# Patient Record
Sex: MALE | Race: BLACK OR AFRICAN AMERICAN
[De-identification: names, ages, dates, MRNs, and addresses within clinical notes are randomized per-mention and may not be internally consistent; named-entity substitution may affect disease eponyms.]

---

## 2018-08-05 ENCOUNTER — HOSPITAL ENCOUNTER (EMERGENCY)
Dept: HOSPITAL 47 - EC | Age: 48
Discharge: HOME | End: 2018-08-05
Payer: COMMERCIAL

## 2018-08-05 VITALS
DIASTOLIC BLOOD PRESSURE: 62 MMHG | RESPIRATION RATE: 16 BRPM | TEMPERATURE: 98 F | HEART RATE: 62 BPM | SYSTOLIC BLOOD PRESSURE: 121 MMHG

## 2018-08-05 DIAGNOSIS — Z87.442: ICD-10-CM

## 2018-08-05 DIAGNOSIS — R10.9: Primary | ICD-10-CM

## 2018-08-05 DIAGNOSIS — Z98.890: ICD-10-CM

## 2018-08-05 DIAGNOSIS — M54.9: ICD-10-CM

## 2018-08-05 LAB
PH UR: 6 [PH] (ref 5–8)
SP GR UR: 1.01 (ref 1–1.03)
UROBILINOGEN UR QL STRIP: <2 MG/DL (ref ?–2)

## 2018-08-05 PROCEDURE — 99284 EMERGENCY DEPT VISIT MOD MDM: CPT

## 2018-08-05 PROCEDURE — 81003 URINALYSIS AUTO W/O SCOPE: CPT

## 2018-08-05 PROCEDURE — 96372 THER/PROPH/DIAG INJ SC/IM: CPT

## 2018-08-05 PROCEDURE — 87086 URINE CULTURE/COLONY COUNT: CPT

## 2018-08-05 PROCEDURE — 74176 CT ABD & PELVIS W/O CONTRAST: CPT

## 2018-08-05 NOTE — ED
General Adult HPI





- General


Chief complaint: Back Pain/Injury


Stated complaint: POSS KIDNEY STONE


Time Seen by Provider: 08/05/18 19:13


Source: patient, RN notes reviewed, old records reviewed


Mode of arrival: ambulatory


Limitations: no limitations





- History of Present Illness


Initial comments: 





This is a 47-year-old male the ER for evaluation pain flank pain and back pain.

  Patient has no significant medical history history of kidney stones states 

this feels similar.  Left-sided flank and back pain.  Patient had no injury 

noted, no blood in his urine no fevers.  No recent IV drug use or abuse.  

Patient is complaining of left-sided flank pain left sided back pain.  No real 

modifying factors for symptoms





- Related Data


 Previous Rx's











 Medication  Instructions  Recorded


 


Naproxen [Naprosyn] 500 mg PO Q12HR PRN #30 tab 08/05/18


 


Tamsulosin [Flomax] 0.4 mg PO DAILY #7 cap 08/05/18











 Allergies











Allergy/AdvReac Type Severity Reaction Status Date / Time


 


No Known Allergies Allergy   Verified 08/05/18 19:11














Review of Systems


ROS Statement: 


Those systems with pertinent positive or pertinent negative responses have been 

documented in the HPI.





ROS Other: All systems not noted in ROS Statement are negative.





Past Medical History


Past Medical History: No Reported History


History of Any Multi-Drug Resistant Organisms: None Reported


Additional Past Surgical History / Comment(s): parathyroid surgery


Past Psychological History: No Psychological Hx Reported


Smoking Status: Never smoker


Past Alcohol Use History: Occasional


Past Drug Use History: None Reported





General Exam


Limitations: no limitations


General appearance: alert, in no apparent distress


Head exam: Present: atraumatic, normocephalic, normal inspection


Eye exam: Present: normal appearance, PERRL, EOMI.  Absent: scleral icterus, 

conjunctival injection, periorbital swelling


ENT exam: Present: normal exam, mucous membranes moist


Neck exam: Present: normal inspection.  Absent: tenderness, meningismus, 

lymphadenopathy


Respiratory exam: Present: normal lung sounds bilaterally.  Absent: respiratory 

distress, wheezes, rales, rhonchi, stridor


Cardiovascular Exam: Present: regular rate, normal rhythm, normal heart sounds.

  Absent: systolic murmur, diastolic murmur, rubs, gallop, clicks


GI/Abdominal exam: Present: soft, normal bowel sounds.  Absent: distended, 

tenderness, guarding, rebound, rigid


Extremities exam: Present: normal inspection, full ROM, normal capillary 

refill.  Absent: tenderness, pedal edema, joint swelling, calf tenderness


Back exam: Present: normal inspection


Neurological exam: Present: alert, oriented X3, CN II-XII intact


Psychiatric exam: Present: normal affect, normal mood


Skin exam: Present: warm, dry, intact, normal color.  Absent: rash





Course


 Vital Signs











  08/05/18 08/05/18





  19:09 21:20


 


Temperature 98.6 F 98.0 F


 


Pulse Rate 58 L 62


 


Respiratory 18 16





Rate  


 


Blood Pressure 125/82 121/62


 


O2 Sat by Pulse 99 98





Oximetry  














- Reevaluation(s)


Reevaluation #1: 





Chest at length patient's symptoms, negative CT.  Possible passage of kidney 

stone








Medical Decision Making





- Medical Decision Making





47 male the ER for eversion possible kidney stone, history of kidney stones.  

Patient does have CT here which is negative urine is negative.  Patient will be 

discharged home





- Lab Data


 Lab Results











  08/05/18 Range/Units





  20:00 


 


Urine Color  Light Yellow  


 


Urine Appearance  Clear  (Clear)  


 


Urine pH  6.0  (5.0-8.0)  


 


Ur Specific Gravity  1.010  (1.001-1.035)  


 


Urine Protein  Negative  (Negative)  


 


Urine Glucose (UA)  Negative  (Negative)  


 


Urine Ketones  Negative  (Negative)  


 


Urine Blood  Negative  (Negative)  


 


Urine Nitrite  Negative  (Negative)  


 


Urine Bilirubin  Negative  (Negative)  


 


Urine Urobilinogen  <2.0  (<2.0)  mg/dL


 


Ur Leukocyte Esterase  Negative  (Negative)  














- Radiology Data


Radiology results: report reviewed (CT abdomen and pelvis negative for acute 

disease), image reviewed





Disposition


Clinical Impression: 


 Left flank pain





Disposition: HOME SELF-CARE


Condition: Good


Instructions:  Acute Low Back Pain (ED)


Prescriptions: 


Naproxen [Naprosyn] 500 mg PO Q12HR PRN #30 tab


 PRN Reason: Pain


Tamsulosin [Flomax] 0.4 mg PO DAILY #7 cap


Is patient prescribed a controlled substance at d/c from ED?: No


Referrals: 


Jolanta Lake MD [Primary Care Provider] - 1-2 days

## 2018-08-05 NOTE — CT
EXAMINATION TYPE: CT abdomen pelvis wo con

 

DATE OF EXAM: 8/5/2018

 

COMPARISON: 12/22/2010

 

HISTORY: 47-year-old male Bilateral flank pain.

 

CT DLP: 445.1 mGycm. Automated exposure control for dose reduction was used.

 

TECHNIQUE: Contiguous axial scanning of the abdomen and pelvis without IV contrast. Coronal and sagit
clementina reconstructions performed. 

 

FINDINGS: 

Heart normal size without pericardial effusion. Strandy atelectasis in the lower lungs. No pleural ef
fusion.

 

Tiny hiatal hernia.

 

Noncontrast images of the liver show a couple scattered hypodense lesions too small for accurate CT c
haracterization, largest posteriorly in the right lobe measures 1.3 cm, slightly larger from 2010, mo
st suggestive of a cyst.

 

Gallbladder, adrenal glands, spleen, and pancreas show no gross anomaly.

 

No nephrolithiasis or hydronephrosis.

 

Stable borderline sized colton hepatic lymph node at 9 mm. Additional scattered prominent but nonenlar
ged mesenteric lymph nodes are unchanged.

 

No dilated small bowel, free fluid, or free air. Normal appendix. Mild to moderate stool burden. Scat
tered colonic diverticulosis, greatest in the sigmoid colon. No pericolonic inflammatory change.

 

Bladder is urine distended. Prostate gland measures 4.2 cm wide. Phlebolith in the right side of the 
pelvis. No abnormal fluid collection the pelvis or pelvic lymphadenopathy.

 

Bones: Mild degenerative changes at the hips. Stable left acetabular bone island. There is some bony 
ankylosis across the SI joints. No osseous destructive process.

 

 

 

 

IMPRESSION: 

1.  No nephrolithiasis or hydronephrosis.

2.  Colonic diverticulosis without acute diverticulitis.

3.  Tiny hiatal hernia.

4.  Ankylosis across the SI joints. Clinically correlate. Findings can be seen in the setting of infl
ammatory spondyloarthropathy. No fusion across the spine is seen at this time.

## 2019-02-28 ENCOUNTER — HOSPITAL ENCOUNTER (OUTPATIENT)
Dept: HOSPITAL 47 - LABWHC1 | Age: 49
Discharge: HOME | End: 2019-02-28
Payer: COMMERCIAL

## 2019-02-28 DIAGNOSIS — E78.5: Primary | ICD-10-CM

## 2019-02-28 LAB
ALBUMIN SERPL-MCNC: 4.8 G/DL (ref 3.8–4.9)
ALBUMIN/GLOB SERPL: 2 G/DL (ref 1.6–3.17)
ALP SERPL-CCNC: 52 U/L (ref 41–126)
ALT SERPL-CCNC: 31 U/L (ref 10–49)
ANION GAP SERPL CALC-SCNC: 8.6 MMOL/L (ref 4–12)
AST SERPL-CCNC: 28 U/L (ref 14–35)
BUN SERPL-SCNC: 17 MG/DL (ref 9–27)
CALCIUM SPEC-MCNC: 9.8 MG/DL (ref 8.7–10.3)
CHLORIDE SERPL-SCNC: 106 MMOL/L (ref 96–109)
CHOLEST SERPL-MCNC: 232 MG/DL (ref 0–200)
CO2 SERPL-SCNC: 26.4 MMOL/L (ref 21.6–31.8)
GLOBULIN SER CALC-MCNC: 2.4 G/DL (ref 1.6–3.3)
GLUCOSE SERPL-MCNC: 89 MG/DL (ref 70–110)
HDLC SERPL-MCNC: 37 MG/DL (ref 40–60)
LDLC SERPL CALC-MCNC: 154.6 MG/DL (ref 0–131)
POTASSIUM SERPL-SCNC: 4.4 MMOL/L (ref 3.5–5.5)
PROT SERPL-MCNC: 7.2 G/DL (ref 6.2–8.2)
SODIUM SERPL-SCNC: 141 MMOL/L (ref 135–145)
TRIGL SERPL-MCNC: 202 MG/DL (ref 0–149)
VLDLC SERPL CALC-MCNC: 40.4 MG/DL (ref 5–40)

## 2019-02-28 PROCEDURE — 80061 LIPID PANEL: CPT

## 2019-02-28 PROCEDURE — 80053 COMPREHEN METABOLIC PANEL: CPT

## 2019-02-28 PROCEDURE — 36415 COLL VENOUS BLD VENIPUNCTURE: CPT

## 2019-02-28 PROCEDURE — 84443 ASSAY THYROID STIM HORMONE: CPT

## 2021-01-27 ENCOUNTER — HOSPITAL ENCOUNTER (OUTPATIENT)
Dept: HOSPITAL 47 - RADUSWWP | Age: 51
Discharge: HOME | End: 2021-01-27
Attending: INTERNAL MEDICINE
Payer: COMMERCIAL

## 2021-01-27 DIAGNOSIS — K80.20: ICD-10-CM

## 2021-01-27 DIAGNOSIS — K76.89: Primary | ICD-10-CM

## 2021-01-27 PROCEDURE — 76700 US EXAM ABDOM COMPLETE: CPT

## 2021-01-27 NOTE — US
EXAMINATION TYPE: US abdomen complete

 

DATE OF EXAM: 1/27/2021

 

COMPARISON: NONE

 

CLINICAL HISTORY: Q44.6 Cystic disease of liver. Limited due to body habitus

 

EXAM MEASUREMENTS:

 

Liver Length:  12.1 cm   

Gallbladder Wall:  .2 cm   

CBD:  .5 cm

Spleen:  12 cm   

Right Kidney:  10.3 x 4.6 x 4.9  cm 

Left Kidney:  10.6 x 5.1 x 4.6 cm   

 

 

 

Pancreas:  Obscured by bowel gas

Liver:  0.9 cm cystic area left lobe  

Gallbladder:  Stones visualized

**Evidence for sonographic [place]'s sign:  No

CBD:  wnl 

Spleen:  wnl   

Right Kidney:  wnl   

Left Kidney:  wnl   

Upper IVC:  wnl  

Abd Aorta:  wnl

 

 

IMPRESSION: 

1. Hepatic cyst

2. Cholelithiasis

## 2021-01-30 ENCOUNTER — HOSPITAL ENCOUNTER (EMERGENCY)
Dept: HOSPITAL 47 - EC | Age: 51
Discharge: HOME | End: 2021-01-30
Payer: COMMERCIAL

## 2021-01-30 VITALS — RESPIRATION RATE: 20 BRPM

## 2021-01-30 VITALS — SYSTOLIC BLOOD PRESSURE: 135 MMHG | DIASTOLIC BLOOD PRESSURE: 94 MMHG | HEART RATE: 78 BPM | TEMPERATURE: 98.2 F

## 2021-01-30 DIAGNOSIS — Z79.51: ICD-10-CM

## 2021-01-30 DIAGNOSIS — K57.32: Primary | ICD-10-CM

## 2021-01-30 DIAGNOSIS — F17.200: ICD-10-CM

## 2021-01-30 LAB
ALBUMIN SERPL-MCNC: 4.8 G/DL (ref 3.5–5)
ALP SERPL-CCNC: 57 U/L (ref 38–126)
ALT SERPL-CCNC: 23 U/L (ref 4–49)
AMYLASE SERPL-CCNC: 75 U/L (ref 30–110)
ANION GAP SERPL CALC-SCNC: 12 MMOL/L
AST SERPL-CCNC: 29 U/L (ref 17–59)
BASOPHILS # BLD AUTO: 0.1 K/UL (ref 0–0.2)
BASOPHILS NFR BLD AUTO: 1 %
BUN SERPL-SCNC: 13 MG/DL (ref 9–20)
CALCIUM SPEC-MCNC: 10.3 MG/DL (ref 8.4–10.2)
CHLORIDE SERPL-SCNC: 103 MMOL/L (ref 98–107)
CO2 SERPL-SCNC: 22 MMOL/L (ref 22–30)
EOSINOPHIL # BLD AUTO: 0.3 K/UL (ref 0–0.7)
EOSINOPHIL NFR BLD AUTO: 2 %
ERYTHROCYTE [DISTWIDTH] IN BLOOD BY AUTOMATED COUNT: 4.96 M/UL (ref 4.3–5.9)
ERYTHROCYTE [DISTWIDTH] IN BLOOD: 12.7 % (ref 11.5–15.5)
GLUCOSE SERPL-MCNC: 124 MG/DL (ref 74–99)
HCT VFR BLD AUTO: 43.2 % (ref 39–53)
HGB BLD-MCNC: 15.1 GM/DL (ref 13–17.5)
LIPASE SERPL-CCNC: 63 U/L (ref 23–300)
LYMPHOCYTES # SPEC AUTO: 1.2 K/UL (ref 1–4.8)
LYMPHOCYTES NFR SPEC AUTO: 10 %
MCH RBC QN AUTO: 30.4 PG (ref 25–35)
MCHC RBC AUTO-ENTMCNC: 34.9 G/DL (ref 31–37)
MCV RBC AUTO: 87.1 FL (ref 80–100)
MONOCYTES # BLD AUTO: 0.9 K/UL (ref 0–1)
MONOCYTES NFR BLD AUTO: 7 %
NEUTROPHILS # BLD AUTO: 9.9 K/UL (ref 1.3–7.7)
NEUTROPHILS NFR BLD AUTO: 80 %
PH UR: 6 [PH] (ref 5–8)
PLATELET # BLD AUTO: 178 K/UL (ref 150–450)
POTASSIUM SERPL-SCNC: 3.9 MMOL/L (ref 3.5–5.1)
PROT SERPL-MCNC: 8.3 G/DL (ref 6.3–8.2)
SODIUM SERPL-SCNC: 137 MMOL/L (ref 137–145)
SP GR UR: 1.01 (ref 1–1.03)
UROBILINOGEN UR QL STRIP: <2 MG/DL (ref ?–2)
WBC # BLD AUTO: 12.5 K/UL (ref 3.8–10.6)

## 2021-01-30 PROCEDURE — 83690 ASSAY OF LIPASE: CPT

## 2021-01-30 PROCEDURE — 36415 COLL VENOUS BLD VENIPUNCTURE: CPT

## 2021-01-30 PROCEDURE — 81003 URINALYSIS AUTO W/O SCOPE: CPT

## 2021-01-30 PROCEDURE — 99284 EMERGENCY DEPT VISIT MOD MDM: CPT

## 2021-01-30 PROCEDURE — 82150 ASSAY OF AMYLASE: CPT

## 2021-01-30 PROCEDURE — 74177 CT ABD & PELVIS W/CONTRAST: CPT

## 2021-01-30 PROCEDURE — 80053 COMPREHEN METABOLIC PANEL: CPT

## 2021-01-30 PROCEDURE — 85025 COMPLETE CBC W/AUTO DIFF WBC: CPT

## 2021-01-30 PROCEDURE — 83605 ASSAY OF LACTIC ACID: CPT

## 2021-01-30 PROCEDURE — 96360 HYDRATION IV INFUSION INIT: CPT

## 2021-01-30 NOTE — ED
Abdominal Pain HPI





- General


Chief Complaint: Abdominal Pain


Stated Complaint: Abd Pain


Time Seen by Provider: 01/30/21 21:21


Source: patient


Mode of arrival: ambulatory


Limitations: no limitations





- History of Present Illness


Initial Comments: 


50-year-old male patient presents to the emergency department today for 

evaluation of generalized abdominal discomfort and pain.  Patient states he did 

have a bowel movement but still feels like he was quite full.  He is passing 

gas.  Denies fever or chills.  Denies nausea or vomiting.  States he has had no 

appetite today.  Denies history of abdominal surgery.  States he takes Singulair

and Claritin but no other medications. Patient denies any recent rash, cough, 

shortness of breath, chest pain, back pain, numbness, tingling, dizziness, 

weakness, hematuria, dysuria, urinary urgency, urinary frequency, headache, 

visual changes, or any other complaints.








- Related Data


                                Home Medications











 Medication  Instructions  Recorded  Confirmed


 


Loratadine-Pseudoeph  mg 1 tab PO DAILY@1900 01/30/21 01/30/21





[Claritin-D 24 Hour]   


 


Montelukast Sodium [Singulair] 10 mg PO DAILY@1900 01/30/21 01/30/21








                                  Previous Rx's











 Medication  Instructions  Recorded


 


Amoxic-Pot Clav 875-125Mg 1 tab PO Q12HR #20 tablet 01/30/21





[Augmentin 875-125]  


 


Ondansetron [Zofran ODT] 4 mg PO Q8HR PRN #10 tab 01/30/21











                                    Allergies











Allergy/AdvReac Type Severity Reaction Status Date / Time


 


No Known Allergies Allergy   Verified 01/30/21 21:59














Review of Systems


ROS Statement: 


Those systems with pertinent positive or pertinent negative responses have been 

documented in the HPI.





ROS Other: All systems not noted in ROS Statement are negative.





Past Medical History


Past Medical History: No Reported History


History of Any Multi-Drug Resistant Organisms: None Reported


Additional Past Surgical History / Comment(s): parathyroid surgery


Past Psychological History: No Psychological Hx Reported


Smoking Status: Current every day smoker


Past Alcohol Use History: Occasional


Past Drug Use History: Marijuana





General Exam


Limitations: no limitations


General appearance: alert, in no apparent distress, other (this is a well-

developed, well-nourished adult male patient in no acute distress.)


Eye exam: Present: normal appearance, PERRL, EOMI.  Absent: scleral icterus, 

conjunctival injection, periorbital swelling


ENT exam: Present: normal exam, normal oropharynx, mucous membranes moist


Respiratory exam: Present: normal lung sounds bilaterally.  Absent: respiratory 

distress, wheezes, rales, rhonchi, stridor


Cardiovascular Exam: Present: regular rate, normal rhythm, normal heart sounds. 

Absent: systolic murmur, diastolic murmur, rubs, gallop, clicks


GI/Abdominal exam: Present: soft, tenderness (generalized especially over the 

left lower and suprapubic regions), normal bowel sounds.  Absent: distended, 

guarding, rebound, rigid


Neurological exam: Present: alert, oriented X3, CN II-XII intact


Psychiatric exam: Present: normal affect, normal mood


Skin exam: Present: warm, dry, intact, normal color.  Absent: rash





Course


                                   Vital Signs











  01/30/21 01/30/21 01/30/21





  21:15 22:31 23:10


 


Temperature 98.7 F  98.2 F


 


Pulse Rate 95 74 78


 


Respiratory 20 20 20





Rate   


 


Blood Pressure 143/91 137/90 135/94


 


O2 Sat by Pulse 99 98 98





Oximetry   














Medical Decision Making





- Medical Decision Making


50-year-old male patient presents to the emergency department today for 

evaluation of generalized abdominal pain and discomfort.  Denies vomiting.  No 

fever.  Physical examination did reveal tenderness over the suprapubic and left 

lower quadrant regions.  Labs reviewed and did reveal white blood cell count at 

12.5.  Did obtain CT abdomen and pelvis did show evidence for sigmoid 

diverticulitis.  As patient is tolerating oral intake currently and there is no 

evidence for abscess we will be able to discharge home with oral antibiotics.  

He is given starter packs her pain and nausea medication.  Given a dose of 

Augmentin here.  He is instructed to follow-up with his primary care physician 

for recheck in 1-2 days.  We did discuss return parameters in detail.  He 

verbalizes understanding and agrees with this plan. Case discussed with Dr. Welsh.








- Lab Data


Result diagrams: 


                                 01/30/21 21:34





                                 01/30/21 21:34


                                   Lab Results











  01/30/21 01/30/21 01/30/21 Range/Units





  21:34 21:34 21:34 


 


WBC  12.5 H    (3.8-10.6)  k/uL


 


RBC  4.96    (4.30-5.90)  m/uL


 


Hgb  15.1    (13.0-17.5)  gm/dL


 


Hct  43.2    (39.0-53.0)  %


 


MCV  87.1    (80.0-100.0)  fL


 


MCH  30.4    (25.0-35.0)  pg


 


MCHC  34.9    (31.0-37.0)  g/dL


 


RDW  12.7    (11.5-15.5)  %


 


Plt Count  178    (150-450)  k/uL


 


MPV  10.3    


 


Neutrophils %  80    %


 


Lymphocytes %  10    %


 


Monocytes %  7    %


 


Eosinophils %  2    %


 


Basophils %  1    %


 


Neutrophils #  9.9 H    (1.3-7.7)  k/uL


 


Lymphocytes #  1.2    (1.0-4.8)  k/uL


 


Monocytes #  0.9    (0-1.0)  k/uL


 


Eosinophils #  0.3    (0-0.7)  k/uL


 


Basophils #  0.1    (0-0.2)  k/uL


 


Sodium    137  (137-145)  mmol/L


 


Potassium    3.9  (3.5-5.1)  mmol/L


 


Chloride    103  ()  mmol/L


 


Carbon Dioxide    22  (22-30)  mmol/L


 


Anion Gap    12  mmol/L


 


BUN    13  (9-20)  mg/dL


 


Creatinine    0.91  (0.66-1.25)  mg/dL


 


Est GFR (CKD-EPI)AfAm    >90  (>60 ml/min/1.73 sqM)  


 


Est GFR (CKD-EPI)NonAf    >90  (>60 ml/min/1.73 sqM)  


 


Glucose    124 H  (74-99)  mg/dL


 


Plasma Lactic Acid Alejandro     (0.7-2.0)  mmol/L


 


Calcium    10.3 H  (8.4-10.2)  mg/dL


 


Total Bilirubin    1.1  (0.2-1.3)  mg/dL


 


AST    29  (17-59)  U/L


 


ALT    23  (4-49)  U/L


 


Alkaline Phosphatase    57  ()  U/L


 


Total Protein    8.3 H  (6.3-8.2)  g/dL


 


Albumin    4.8  (3.5-5.0)  g/dL


 


Amylase    75  ()  U/L


 


Lipase    63  ()  U/L


 


Urine Color   Yellow   


 


Urine Appearance   Clear   (Clear)  


 


Urine pH   6.0   (5.0-8.0)  


 


Ur Specific Gravity   1.013   (1.001-1.035)  


 


Urine Protein   Negative   (Negative)  


 


Urine Glucose (UA)   Negative   (Negative)  


 


Urine Ketones   Negative   (Negative)  


 


Urine Blood   Negative   (Negative)  


 


Urine Nitrite   Negative   (Negative)  


 


Urine Bilirubin   Negative   (Negative)  


 


Urine Urobilinogen   <2.0   (<2.0)  mg/dL


 


Ur Leukocyte Esterase   Negative   (Negative)  














  01/30/21 Range/Units





  21:34 


 


WBC   (3.8-10.6)  k/uL


 


RBC   (4.30-5.90)  m/uL


 


Hgb   (13.0-17.5)  gm/dL


 


Hct   (39.0-53.0)  %


 


MCV   (80.0-100.0)  fL


 


MCH   (25.0-35.0)  pg


 


MCHC   (31.0-37.0)  g/dL


 


RDW   (11.5-15.5)  %


 


Plt Count   (150-450)  k/uL


 


MPV   


 


Neutrophils %   %


 


Lymphocytes %   %


 


Monocytes %   %


 


Eosinophils %   %


 


Basophils %   %


 


Neutrophils #   (1.3-7.7)  k/uL


 


Lymphocytes #   (1.0-4.8)  k/uL


 


Monocytes #   (0-1.0)  k/uL


 


Eosinophils #   (0-0.7)  k/uL


 


Basophils #   (0-0.2)  k/uL


 


Sodium   (137-145)  mmol/L


 


Potassium   (3.5-5.1)  mmol/L


 


Chloride   ()  mmol/L


 


Carbon Dioxide   (22-30)  mmol/L


 


Anion Gap   mmol/L


 


BUN   (9-20)  mg/dL


 


Creatinine   (0.66-1.25)  mg/dL


 


Est GFR (CKD-EPI)AfAm   (>60 ml/min/1.73 sqM)  


 


Est GFR (CKD-EPI)NonAf   (>60 ml/min/1.73 sqM)  


 


Glucose   (74-99)  mg/dL


 


Plasma Lactic Acid Alejandro  1.3  (0.7-2.0)  mmol/L


 


Calcium   (8.4-10.2)  mg/dL


 


Total Bilirubin   (0.2-1.3)  mg/dL


 


AST   (17-59)  U/L


 


ALT   (4-49)  U/L


 


Alkaline Phosphatase   ()  U/L


 


Total Protein   (6.3-8.2)  g/dL


 


Albumin   (3.5-5.0)  g/dL


 


Amylase   ()  U/L


 


Lipase   ()  U/L


 


Urine Color   


 


Urine Appearance   (Clear)  


 


Urine pH   (5.0-8.0)  


 


Ur Specific Gravity   (1.001-1.035)  


 


Urine Protein   (Negative)  


 


Urine Glucose (UA)   (Negative)  


 


Urine Ketones   (Negative)  


 


Urine Blood   (Negative)  


 


Urine Nitrite   (Negative)  


 


Urine Bilirubin   (Negative)  


 


Urine Urobilinogen   (<2.0)  mg/dL


 


Ur Leukocyte Esterase   (Negative)  














- Radiology Data


Radiology results: report reviewed, image reviewed


CT abdomen and pelvis with contrast was obtained.  Report reviewed in its 

entirety.  Impression by Dr. Castro shows sigmoid diverticulitis.  No 

drainable fluid collection.





Disposition


Clinical Impression: 


 Sigmoid diverticulitis





Disposition: HOME SELF-CARE


Condition: Good


Instructions (If sedation given, give patient instructions):  Diverticulitis 

(ED), Diverticulitis Diet (ED)


Additional Instructions: 


Take medications as directed. Stick to bland diet for the next few days. Follow 

up with your primary care physician for recheck in 1-2 days. Return to the 

emergency department for any new, worsening, or concerning symptoms. 


Prescriptions: 


Amoxic-Pot Clav 875-125Mg [Augmentin 875-125] 1 tab PO Q12HR #20 tablet


Ondansetron [Zofran ODT] 4 mg PO Q8HR PRN #10 tab


 PRN Reason: Nausea


Is patient prescribed a controlled substance at d/c from ED?: No


Referrals: 


Rachel Camejo MD [Primary Care Provider] - 1-2 days


Time of Disposition: 22:55

## 2021-01-30 NOTE — CT
EXAMINATION TYPE: CT abdomen pelvis w con

 

DATE OF EXAM: 1/30/2021

 

COMPARISON: 8/5/2018

 

HISTORY: Generalized abdominal pain.

 

CT DLP: 863.3 mGycm

Automated exposure control for dose reduction was used.

 

CONTRAST: 

Performed with IV Contrast, patient injected with 100 mL of Isovue 300.

 

 

Lung bases are clear of consolidation. There is no pleural effusion. Heart size is normal.

 

There are scattered cysts in the liver that measure up to 1.5 cm. Bile ducts are not dilated. Spleen 
pancreas stomach gallbladder appear intact.

 

There is no adrenal mass. Kidneys show satisfactory contrast opacification. There is no hydronephrosi
s. Ureters are not dilated. There is no retroperitoneal adenopathy. Appendix is posterior and appears
 normal. Bladder distends smoothly. There is no inguinal hernia. There is no free fluid in the pelvis
.

 

There is fat stranding and wall thickening of the mid sigmoid colon. There is 6 cm segment of involve
ment. There are multiple sigmoid diverticula.

There is no evidence of bowel obstruction. There is no free air. There is no ascites.

Lumbar vertebra have normal alignment. Posterior elements are intact. There is no lumbar compression 
fracture. Bony pelvis is intact. The hip joints are intact.

 

IMPRESSION:

There is sigmoid diverticulitis. No drainable fluid collection.

## 2021-04-08 ENCOUNTER — HOSPITAL ENCOUNTER (OUTPATIENT)
Dept: HOSPITAL 47 - SLEEP | Age: 51
Discharge: HOME | End: 2021-04-08
Attending: INTERNAL MEDICINE
Payer: COMMERCIAL

## 2021-04-08 DIAGNOSIS — T78.40XA: ICD-10-CM

## 2021-04-08 DIAGNOSIS — Z90.09: ICD-10-CM

## 2021-04-08 DIAGNOSIS — G47.33: Primary | ICD-10-CM

## 2021-04-08 DIAGNOSIS — Z99.89: ICD-10-CM

## 2021-04-08 DIAGNOSIS — G47.31: ICD-10-CM

## 2021-04-08 PROCEDURE — 99211 OFF/OP EST MAY X REQ PHY/QHP: CPT

## 2021-04-08 NOTE — CONS
CONSULTATION



DATE OF SERVICE:

04/08/2021



This 50-year-old gentleman who has been re-evaluated in [facility] for obstructive

sleep apnea-hypopnea syndrome.



HISTORY OF PRESENT ILLNESS/SLEEP-WAKE EVALUATION:

The patient has been diagnosed with severe central and obstructive sleep apnea-hypopnea

syndrome in our institution in 2011.  At that time, apnea-hypopnea index was 40.9,

which includes 51 obstructive apneas, 73 central apneas, 44 mixed apneas, 23 hypopneas.

The patient was treated with BiPAP with a pressure of 12/6 and for this 10 years,

patient continued to use his BiPAP equipment. About 3 months ago, he received new BiPAP

and came to re-evaluation to sleep center.



He continued to use BiPAP equipment every night.  Usually no significant snoring with

BiPAP.  His sleep schedule from 11 p.m. to 4:30 a.m. on weekdays and from midnight

until 9 a.m. on weekends.  No problem with falling asleep, although has TV set in

bedroom.  He sleeps on the side position.  He wakes up from sleep up to 2 times with

one episode of nocturia.



No history of hypnagogic hallucinations, sleep paralysis or cataplexy.  He does not

take any naps.  [place] Sleepiness Scale today is 7.



PAST MEDICAL HISTORY:

Positive for allergies.



PAST SURGICAL HISTORY:

Parathyroidectomy.



MEDICATIONS:

Claritin-D and Singulair.



SOCIAL HISTORY:

Negative for smoking or using alcohol.



FAMILY HISTORY:

Hyperlipidemia and thyroid problems.



REVIEW OF SYSTEMS:

Occasional awakenings from sleep, snoring, and problem with breathing without BiPAP.



PHYSICAL EXAMINATION:

GENERAL:  gentleman without distress.

VITAL SIGNS: /84, HR 57, RR 15, height 5 feet 8-1/2 inches, weight 184.0, BMI

27.8, temperature 98.0, oxygen saturation at room air 98%.

HEENT: PERRLA, EOMI. Low position of soft palate.

NECK: 16 inches in circumference.

LUNGS:  Clear to percussion and to auscultation.  Good air exchange.  No wheezing or

rhonchi.

HEART:  S1, S2 regular.  No murmurs, gallops, or rubs.

ABDOMEN:  Soft and nontender.  Bowel sounds are present.  No organomegaly appreciated.

EXTREMITIES: No clubbing or cyanosis.

CNS:  Awake, alert, and oriented X3.  Cranial nerves 2 to 7 intact.  There is no

fasciculation or atrophy. noted.  No focal deficits observed.



I checked the patient BiPAP unit.  BiPAP pressure is 12/16 cm of water. Usage is 28 out

of 30 nights and 23 out of 30 nights more than 4 hours, average usage 5.3 hours per

night. Leak is 10 L/minute.  Apnea-hypopnea index 6.8, which includes central apnea-

hypopnea index 3.6.



IMPRESSION:

1. 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  The patient

    demonstrated good compliance with treatment, benefitting from treatment.  Apnea-

    hypopnea index still slightly increased by results of reading from his BiPAP unit.

2. Allergy.

3. Status post parathyroidectomy.

4. History of snoring.



PLAN:

1. I adjusted his BiPAP to automatic regimen with maximal inspiratory pressure 15 and

    minimal expiratory pressure of 5 with pressure support 4.

2. Patient should continue to use BiPAP equipment every night for the whole night.

3. Watching weight.

4. Sleep hygiene with regular time in bed for 7-1/2 or 8 hours.

5. No driving if feeling sleepiness.

6. Follow-up visit in 6 months.  The patient should have regular monitoring in Sleep

    Center to be sure that the equipment is working properly and he gets all necessary

    supplies.

Thank you very much for allowing me to participate in management of your patient.



Sincerely,



MD Josh, PhD, FAASM

Diplomat of [employer]

American Board of [employer]

Medical Director of [facility] [place]



MMODL / JAKOBN: [id number] / Job#: [id number]

## 2021-05-15 ENCOUNTER — HOSPITAL ENCOUNTER (EMERGENCY)
Dept: HOSPITAL 47 - EC | Age: 51
Discharge: HOME | End: 2021-05-15
Payer: COMMERCIAL

## 2021-05-15 VITALS
DIASTOLIC BLOOD PRESSURE: 96 MMHG | RESPIRATION RATE: 16 BRPM | TEMPERATURE: 97.8 F | HEART RATE: 56 BPM | SYSTOLIC BLOOD PRESSURE: 151 MMHG

## 2021-05-15 DIAGNOSIS — Z20.822: Primary | ICD-10-CM

## 2021-05-15 DIAGNOSIS — F12.90: ICD-10-CM

## 2021-05-15 DIAGNOSIS — Z87.891: ICD-10-CM

## 2021-05-15 PROCEDURE — 87635 SARS-COV-2 COVID-19 AMP PRB: CPT

## 2021-05-15 PROCEDURE — 99283 EMERGENCY DEPT VISIT LOW MDM: CPT

## 2021-05-15 NOTE — ED
Recheck HPI





- General


Chief Complaint: Recheck/Abnormal Lab/Rx


Stated Complaint: Covid testing


Time Seen by Provider: 05/15/21 22:15


Source: patient


Mode of arrival: ambulatory


Limitations: no limitations





- History of Present Illness


Initial Comments: 


50-year-old male presenting for covert exposure  Patient denies any recent 

fever, chills, cough, shortness of breath, chest pain, back pain, abdominal 

pain, nausea or vomiting, numbness or tingling, dysuria or hematuria, 

constipation or diarrhea, headaches or visual changes, or any other complaints. 

Fully vaccinated.








- Related Data


                                Home Medications











 Medication  Instructions  Recorded  Confirmed


 


Loratadine-Pseudoeph  mg 1 tab PO DAILY@1900 01/30/21 01/30/21





[Claritin-D 24 Hour]   


 


Montelukast Sodium [Singulair] 10 mg PO DAILY@1900 01/30/21 01/30/21








                                  Previous Rx's











 Medication  Instructions  Recorded


 


Amoxic-Pot Clav 875-125Mg 1 tab PO Q12HR #20 tablet 01/30/21





[Augmentin 875-125]  


 


Ondansetron [Zofran ODT] 4 mg PO Q8HR PRN #10 tab 01/30/21











                                    Allergies











Allergy/AdvReac Type Severity Reaction Status Date / Time


 


No Known Allergies Allergy   Verified 05/15/21 22:12














Review of Systems


ROS Statement: 


Those systems with pertinent positive or pertinent negative responses have been 

documented in the HPI.





ROS Other: All systems not noted in ROS Statement are negative.





Past Medical History


Past Medical History: No Reported History


History of Any Multi-Drug Resistant Organisms: None Reported


Additional Past Surgical History / Comment(s): parathyroid surgery


Past Psychological History: No Psychological Hx Reported


Smoking Status: Former smoker


Past Alcohol Use History: Occasional


Past Drug Use History: Marijuana





General Exam





- General Exam Comments


Initial Comments: 


General:  The patient is awake and alert, in no distress


Eye: pupils are equal, round and reactive to light, extra-ocular movements are 

intact.  No nystagmus.  There is normal conjunctiva bilaterally.  No signs of 

icterus.  


Neurological:  A&O x 3. CN II-XII intact grossly, There are no obvious motor or 

sensory deficits. Coordination appears grossly intact. Speech is normal.


Skin:  Skin is warm and dry and no rashes or lesions are noted. 


Psychiatric:  Cooperative, appropriate mood & affect, normal judgment.  





Limitations: no limitations





Course


                                   Vital Signs











  05/15/21





  22:12


 


Temperature 97.8 F


 


Pulse Rate 56 L


 


Respiratory 16





Rate 


 


Blood Pressure 151/96


 


O2 Sat by Pulse 98





Oximetry 














- Reevaluation(s)


Reevaluation #1: 


called with covid results. pt answered and is aware.


05/15/21 23:16








Medical Decision Making





- Medical Decision Making


swabbed. wants to leave and be called with results. pt dixie be notified. 








- Lab Data


                                   Lab Results











  05/15/21 Range/Units





  22:29 


 


Coronavirus (PCR)  Not Detected  (Not Detectd)  














Disposition


Clinical Impression: 


 Exposure to COVID-19 virus





Disposition: HOME SELF-CARE


Condition: Good


Instructions (If sedation given, give patient instructions):  Coronavirus 

Disease 2019 (COVID-19)


Additional Instructions: 


 Please return to emergency room if the symptoms increase or worsen or for any 

other concerns.


Is patient prescribed a controlled substance at d/c from ED?: No


Referrals: 


Rachel Camejo MD [Primary Care Provider] - 1-2 days


Time of Disposition: 22:32

## 2022-01-20 ENCOUNTER — HOSPITAL ENCOUNTER (EMERGENCY)
Dept: HOSPITAL 47 - EC | Age: 52
Discharge: HOME | End: 2022-01-20
Payer: COMMERCIAL

## 2022-01-20 VITALS — SYSTOLIC BLOOD PRESSURE: 122 MMHG | DIASTOLIC BLOOD PRESSURE: 87 MMHG

## 2022-01-20 VITALS — RESPIRATION RATE: 18 BRPM | TEMPERATURE: 98.1 F

## 2022-01-20 VITALS — HEART RATE: 60 BPM

## 2022-01-20 DIAGNOSIS — F12.90: ICD-10-CM

## 2022-01-20 DIAGNOSIS — F17.200: ICD-10-CM

## 2022-01-20 DIAGNOSIS — K29.70: Primary | ICD-10-CM

## 2022-01-20 LAB
ALBUMIN SERPL-MCNC: 4.7 G/DL (ref 3.5–5)
ALP SERPL-CCNC: 60 U/L (ref 38–126)
ALT SERPL-CCNC: 27 U/L (ref 4–49)
ANION GAP SERPL CALC-SCNC: 10 MMOL/L
APTT BLD: 28.2 SEC (ref 22–30)
AST SERPL-CCNC: 30 U/L (ref 17–59)
BASOPHILS # BLD AUTO: 0 K/UL (ref 0–0.2)
BASOPHILS NFR BLD AUTO: 1 %
BUN SERPL-SCNC: 14 MG/DL (ref 9–20)
CALCIUM SPEC-MCNC: 10.3 MG/DL (ref 8.4–10.2)
CHLORIDE SERPL-SCNC: 104 MMOL/L (ref 98–107)
CO2 SERPL-SCNC: 26 MMOL/L (ref 22–30)
EOSINOPHIL # BLD AUTO: 0.1 K/UL (ref 0–0.7)
EOSINOPHIL NFR BLD AUTO: 2 %
ERYTHROCYTE [DISTWIDTH] IN BLOOD BY AUTOMATED COUNT: 4.97 M/UL (ref 4.3–5.9)
ERYTHROCYTE [DISTWIDTH] IN BLOOD: 12.7 % (ref 11.5–15.5)
GLUCOSE SERPL-MCNC: 91 MG/DL (ref 74–99)
HCT VFR BLD AUTO: 45.1 % (ref 39–53)
HGB BLD-MCNC: 15 GM/DL (ref 13–17.5)
INR PPP: 1 (ref ?–1.2)
LIPASE SERPL-CCNC: 113 U/L (ref 23–300)
LYMPHOCYTES # SPEC AUTO: 1.1 K/UL (ref 1–4.8)
LYMPHOCYTES NFR SPEC AUTO: 22 %
MAGNESIUM SPEC-SCNC: 2 MG/DL (ref 1.6–2.3)
MCH RBC QN AUTO: 30.2 PG (ref 25–35)
MCHC RBC AUTO-ENTMCNC: 33.3 G/DL (ref 31–37)
MCV RBC AUTO: 90.9 FL (ref 80–100)
MONOCYTES # BLD AUTO: 0.4 K/UL (ref 0–1)
MONOCYTES NFR BLD AUTO: 7 %
NEUTROPHILS # BLD AUTO: 3.4 K/UL (ref 1.3–7.7)
NEUTROPHILS NFR BLD AUTO: 66 %
PLATELET # BLD AUTO: 186 K/UL (ref 150–450)
POTASSIUM SERPL-SCNC: 4.3 MMOL/L (ref 3.5–5.1)
PROT SERPL-MCNC: 8.1 G/DL (ref 6.3–8.2)
PT BLD: 10.4 SEC (ref 9–12)
SODIUM SERPL-SCNC: 140 MMOL/L (ref 137–145)
WBC # BLD AUTO: 5.1 K/UL (ref 3.8–10.6)

## 2022-01-20 PROCEDURE — 93005 ELECTROCARDIOGRAM TRACING: CPT

## 2022-01-20 PROCEDURE — 85610 PROTHROMBIN TIME: CPT

## 2022-01-20 PROCEDURE — 80053 COMPREHEN METABOLIC PANEL: CPT

## 2022-01-20 PROCEDURE — 84484 ASSAY OF TROPONIN QUANT: CPT

## 2022-01-20 PROCEDURE — 99284 EMERGENCY DEPT VISIT MOD MDM: CPT

## 2022-01-20 PROCEDURE — 83735 ASSAY OF MAGNESIUM: CPT

## 2022-01-20 PROCEDURE — 36415 COLL VENOUS BLD VENIPUNCTURE: CPT

## 2022-01-20 PROCEDURE — 85025 COMPLETE CBC W/AUTO DIFF WBC: CPT

## 2022-01-20 PROCEDURE — 85730 THROMBOPLASTIN TIME PARTIAL: CPT

## 2022-01-20 PROCEDURE — 71046 X-RAY EXAM CHEST 2 VIEWS: CPT

## 2022-01-20 PROCEDURE — 83690 ASSAY OF LIPASE: CPT

## 2022-01-20 NOTE — XR
EXAMINATION TYPE: XR chest 2V

 

DATE OF EXAM: 1/20/2022

 

COMPARISON: None

 

INDICATION: Pain

 

TECHNIQUE:  Frontal and lateral views of the chest are obtained.

 

FINDINGS:  

The heart size is normal.  

The pulmonary vasculature is normal.

The lungs are clear.

 

IMPRESSION:  

1. No acute pulmonary process.

## 2022-01-20 NOTE — ED
Chest Pain HPI





- General


Chief Complaint: Chest Pain


Stated Complaint: Chest pain


Time Seen by Provider: 01/20/22 08:53


Source: patient, RN notes reviewed


Mode of arrival: ambulatory


Limitations: no limitations





- History of Present Illness


Initial Comments: 


51-year-old male presents emergency Department with chief complaint of 

epigastric discomfort.  Patient states started last night worsened today with 

some exertion, but also worsened at rest at work.  Patient states that it hurts 

to press ripples ribs in his epigastric region he did take some Tums with seemed

to help with didn't really alleviate symptoms.  Patient has no prior cardiac 

disease denies hypertension hyperlipidemia diabetes no family heart disease.  

Patient states does hurt with movement, is nonradiating at this time.  Denies 

any shortness of breath no diaphoretic episodes.  No vomiting.








- Related Data


                                Home Medications











 Medication  Instructions  Recorded  Confirmed


 


Loratadine-Pseudoeph  mg 1 tab PO DAILY 01/30/21 01/20/22





[Claritin-D 24 Hour]   


 


Montelukast Sodium [Singulair] 10 mg PO DAILY 01/30/21 01/20/22








                                  Previous Rx's











 Medication  Instructions  Recorded


 


Omeprazole [PriLOSEC] 40 mg PO DAILY #14 cap 01/20/22











                                    Allergies











Allergy/AdvReac Type Severity Reaction Status Date / Time


 


No Known Allergies Allergy   Verified 01/20/22 08:50














Review of Systems


ROS Statement: 


Those systems with pertinent positive or pertinent negative responses have been 

documented in the HPI.





ROS Other: All systems not noted in ROS Statement are negative.





Past Medical History


Past Medical History: No Reported History


History of Any Multi-Drug Resistant Organisms: None Reported


Additional Past Surgical History / Comment(s): parathyroid surgery


Past Psychological History: No Psychological Hx Reported


Smoking Status: Current every day smoker


Past Alcohol Use History: Occasional


Past Drug Use History: Marijuana





General Exam


Limitations: no limitations


General appearance: alert, in no apparent distress


Head exam: Present: atraumatic, normocephalic, normal inspection


Eye exam: Present: normal appearance, PERRL, EOMI.  Absent: scleral icterus, 

conjunctival injection, periorbital swelling


ENT exam: Present: normal exam, normal oropharynx, mucous membranes moist


Neck exam: Present: normal inspection, full ROM.  Absent: tenderness, 

meningismus, lymphadenopathy


Respiratory exam: Present: normal lung sounds bilaterally.  Absent: respiratory 

distress, wheezes, rales, rhonchi, stridor


Cardiovascular Exam: Present: regular rate, normal rhythm, normal heart sounds. 

 Absent: systolic murmur, diastolic murmur, rubs, gallop, clicks


GI/Abdominal exam: Present: soft, tenderness (Epigastric), normal bowel sounds. 

 Absent: distended, guarding, rebound, rigid





Course


                                   Vital Signs











  01/20/22 01/20/22 01/20/22





  08:50 09:47 09:50


 


Temperature 98.1 F  


 


Pulse Rate 61 61 


 


Pulse Rate [   60





Sitting Cardiac   





Monitor]   


 


Respiratory 18 18 





Rate   


 


Blood Pressure 147/85 142/93 


 


O2 Sat by Pulse 100 100 





Oximetry   














Chest Pain MDM





- MDM


51-year-old male presented for epigastric pain.  Patient has very localized 

tenderness this region.  Patient for concluding labs EKG and chest x-ray with no

acute findings.  Patient's troponin is negative.  EKG does not reveal any acute 

changes.  Patient did have relief of symptoms with GI cocktail.  He did have 

some relief of symptoms with Tums at home.  Patient will be discharged with 

omeprazole patient was given strict return parameters.








Disposition


Clinical Impression: 


 Gastritis





Disposition: HOME SELF-CARE


Condition: Stable


Instructions (If sedation given, give patient instructions):  Gastritis (ED)


Additional Instructions: 


Please return to the Emergency Department if symptoms worsen or any other 

concerns.


Prescriptions: 


Omeprazole [PriLOSEC] 40 mg PO DAILY #14 cap


Is patient prescribed a controlled substance at d/c from ED?: No


Referrals: 


Rachel Camejo MD [Primary Care Provider] - 1-2 days


Time of Disposition: 10:39

## 2023-10-03 ENCOUNTER — HOSPITAL ENCOUNTER (EMERGENCY)
Dept: HOSPITAL 47 - EC | Age: 53
LOS: 1 days | Discharge: HOME | End: 2023-10-04
Payer: COMMERCIAL

## 2023-10-03 VITALS — TEMPERATURE: 98.4 F

## 2023-10-03 DIAGNOSIS — K57.90: ICD-10-CM

## 2023-10-03 DIAGNOSIS — K62.5: Primary | ICD-10-CM

## 2023-10-03 DIAGNOSIS — Z87.891: ICD-10-CM

## 2023-10-03 DIAGNOSIS — F12.90: ICD-10-CM

## 2023-10-03 PROCEDURE — 36415 COLL VENOUS BLD VENIPUNCTURE: CPT

## 2023-10-03 PROCEDURE — 99285 EMERGENCY DEPT VISIT HI MDM: CPT

## 2023-10-03 PROCEDURE — 96360 HYDRATION IV INFUSION INIT: CPT

## 2023-10-03 PROCEDURE — 80053 COMPREHEN METABOLIC PANEL: CPT

## 2023-10-03 PROCEDURE — 74177 CT ABD & PELVIS W/CONTRAST: CPT

## 2023-10-03 PROCEDURE — 85730 THROMBOPLASTIN TIME PARTIAL: CPT

## 2023-10-03 PROCEDURE — 85025 COMPLETE CBC W/AUTO DIFF WBC: CPT

## 2023-10-04 VITALS — HEART RATE: 78 BPM | RESPIRATION RATE: 18 BRPM | DIASTOLIC BLOOD PRESSURE: 94 MMHG | SYSTOLIC BLOOD PRESSURE: 150 MMHG

## 2023-10-04 LAB
ALBUMIN SERPL-MCNC: 4.4 G/DL (ref 3.5–5)
ALP SERPL-CCNC: 54 U/L (ref 38–126)
ALT SERPL-CCNC: 25 U/L (ref 4–49)
ANION GAP SERPL CALC-SCNC: 13 MMOL/L
AST SERPL-CCNC: 27 U/L (ref 17–59)
BASOPHILS # BLD AUTO: 0.1 K/UL (ref 0–0.2)
BASOPHILS NFR BLD AUTO: 1 %
BUN SERPL-SCNC: 19 MG/DL (ref 9–20)
CALCIUM SPEC-MCNC: 9.4 MG/DL (ref 8.4–10.2)
CHLORIDE SERPL-SCNC: 106 MMOL/L (ref 98–107)
CO2 SERPL-SCNC: 22 MMOL/L (ref 22–30)
EOSINOPHIL # BLD AUTO: 0.3 K/UL (ref 0–0.7)
EOSINOPHIL NFR BLD AUTO: 3 %
ERYTHROCYTE [DISTWIDTH] IN BLOOD BY AUTOMATED COUNT: 4.66 M/UL (ref 4.3–5.9)
ERYTHROCYTE [DISTWIDTH] IN BLOOD: 12.3 % (ref 11.5–15.5)
GLUCOSE SERPL-MCNC: 137 MG/DL (ref 74–99)
HCT VFR BLD AUTO: 42.3 % (ref 39–53)
HGB BLD-MCNC: 14.2 GM/DL (ref 13–17.5)
LYMPHOCYTES # SPEC AUTO: 2.4 K/UL (ref 1–4.8)
LYMPHOCYTES NFR SPEC AUTO: 33 %
MCH RBC QN AUTO: 30.5 PG (ref 25–35)
MCHC RBC AUTO-ENTMCNC: 33.5 G/DL (ref 31–37)
MCV RBC AUTO: 90.8 FL (ref 80–100)
MONOCYTES # BLD AUTO: 0.5 K/UL (ref 0–1)
MONOCYTES NFR BLD AUTO: 7 %
NEUTROPHILS # BLD AUTO: 4 K/UL (ref 1.3–7.7)
NEUTROPHILS NFR BLD AUTO: 55 %
PLATELET # BLD AUTO: 221 K/UL (ref 150–450)
POTASSIUM SERPL-SCNC: 4.2 MMOL/L (ref 3.5–5.1)
PROT SERPL-MCNC: 7.4 G/DL (ref 6.3–8.2)
SODIUM SERPL-SCNC: 141 MMOL/L (ref 137–145)
WBC # BLD AUTO: 7.3 K/UL (ref 3.8–10.6)

## 2023-10-04 NOTE — ED
GI Bleed HPI





- General


Chief complaint: GI Bleed


Stated complaint: Rectal bleeding


Time Seen by Provider: 10/03/23 23:29


Source: patient


Mode of arrival: ambulatory


Limitations: no limitations





- History of Present Illness


Initial comments: 


52-year-old male presenting with chief complaint of rectal bleeding.  States 

that the bleeding started this evening and is only present when he goes to have 

a bowel movement.  Blood is dark red.  Patient has history of diverticulitis.  

He denies any rectal or abdominal pain.  No nausea, vomiting, dizziness, chest 

pain, difficulty breathing.  Patient does not take blood thinners.  No 

constipation or diarrhea.  No fevers or chills.








- Related Data


                                Home Medications











 Medication  Instructions  Recorded  Confirmed


 


Loratadine-Pseudoeph  mg 1 tab PO DAILY 01/30/21 01/20/22





[Claritin-D 24 Hour]   


 


Montelukast Sodium [Singulair] 10 mg PO DAILY 01/30/21 01/20/22








                                  Previous Rx's











 Medication  Instructions  Recorded


 


Omeprazole [PriLOSEC] 40 mg PO DAILY #14 cap 01/20/22











                                    Allergies











Allergy/AdvReac Type Severity Reaction Status Date / Time


 


No Known Allergies Allergy   Verified 10/03/23 23:07














Review of Systems


ROS Statement: 


Those systems with pertinent positive or pertinent negative responses have been 

documented in the HPI.





ROS Other: All systems not noted in ROS Statement are negative.





Past Medical History


Past Medical History: Hyperlipidemia


History of Any Multi-Drug Resistant Organisms: None Reported


Additional Past Surgical History / Comment(s): parathyroid surgery


Past Psychological History: No Psychological Hx Reported


Smoking Status: Former smoker


Past Alcohol Use History: Occasional


Past Drug Use History: Marijuana





General Exam


Limitations: no limitations


General appearance: alert, in no apparent distress


Head exam: Present: atraumatic, normocephalic, normal inspection


Eye exam: Present: normal appearance, EOMI


Neck exam: Present: normal inspection, full ROM


Respiratory exam: Present: normal lung sounds bilaterally.  Absent: respiratory 

distress, wheezes, rales, rhonchi, stridor


Cardiovascular Exam: Present: regular rate, normal rhythm, normal heart sounds. 

Absent: systolic murmur, diastolic murmur, rubs, gallop, clicks


GI/Abdominal exam: Present: soft, tenderness (Mild left lower quadrant 

tenderness).  Absent: distended, guarding, rebound, rigid


Rectal exam: Present: normal inspection, heme (-) stool


Neurological exam: Present: alert, oriented X3


Psychiatric exam: Present: normal affect, normal mood


Skin exam: Present: warm, dry, intact, normal color.  Absent: rash





Course


                                   Vital Signs











  10/03/23 10/04/23





  23:05 02:53


 


Temperature 98.4 F 98.4 F


 


Pulse Rate 123 H 78


 


Respiratory 20 18





Rate  


 


Blood Pressure 167/104 150/94


 


O2 Sat by Pulse 96 99





Oximetry  














Medical Decision Making





- Medical Decision Making


Was pt. sent in by a medical professional or institution (, PA, NP, urgent 

care, hospital, or nursing home...) When possible be specific


@  -No


Did you speak to anyone other than the patient for history (EMS, parent, family,

police, friend...)? What history was obtained from this source 


@  -No


Did you review nursing and triage notes (agree or disagree)?  Why? 


@  -I reviewed and agree with nursing and triage notes


Were old charts reviewed (outside hosp., previous admission, EMS record, old 

EKG, old radiological studies, urgent care reports/EKG's, nursing home records)?

Report findings 


@  -No old charts were reviewed


Differential Diagnosis (chest pain, altered mental status, abdominal pain women,

abdominal pain men, vaginal bleeding, weakness, fever, dyspnea, syncope, 

headache, dizziness, GI bleed, back pain, seizure, CVA, palpatations, mental 

health, musculoskeletal)? 


@  -MDM Differential GI Bleed:


Esophageal varices, aortoenteric fistula, [place], gastritis, peptic ulcer

disease, diverticulosis, inflammatory bowel disease, hemorrhoids, fissure, 

colitis, malignancy, Meckel’s diverticulum… this is not meant to be an all-

inclusive list.


EKG interpreted by me (3pts min.).


@  -As above


X-rays interpreted by me (1pt min.).


@  -None done


CT interpreted by me (1pt min.).


@  -No acute findings in the abdomen or pelvis


U/S interpreted by me (1pt. min.).


@  -None done


What testing was considered but not performed or refused? (CT, X-rays, U/S, 

labs)? Why?


@  -None


What meds were considered but not given or refused? Why?


@  -None


Did you discuss the management of the patient with other professionals 

(professionals i.e. Dr., PA, NP, lab, RT, psych nurse, , , 

teacher, , )? Give summary


@  -No


Was smoking cessation discussed for >3mins.?


@  -No


Was critical care preformed (if so, how long)?


@  -No


Were there social determinants of health that impacted care today? How? 

(Homelessness, low income, unemployed, alcoholism, drug addiction, 

transportation, low edu. Level, literacy, decrease access to med. care, residential, 

rehab)?


@  -No


Was there de-escalation of care discussed even if they declined (Discuss DNR or 

withdrawal of care, Hospice)? DNR status


@  -No


What co-morbidities impacted this encounter? (DM, HTN, Smoking, COPD, CAD, 

Cancer, CVA, ARF, Chemo, Hep., AIDS, mental health diagnosis, sleep apnea, 

morbid obesity)?


@  -None


Was patient admitted / discharged? Hospital course, mention meds given and 

route, prescriptions, significant lab abnormalities, going to OR and other 

pertinent info.


@  -52-year-old male presenting with chief complaint of rectal bleeding that 

started the evening.  Physical examination is conducted.  Minor left lower 

quadrant tenderness noted.  No randall blood seen on rectal exam.  No leukocytosis

or anemia.  CT is negative for acute process.  Diverticulosis is noted.  

Bleeding is likely due to diverticulosis or internal hemorrhoids.  No blood 

thinners.  Patient is hemodynamically stable.  Patient is educated on today's 

findings and unexpected management.  Educated on alarms symptoms. Follow-up with

PCP.  Report back to ER with any new or worsening symptoms.  Discussed return 

parameters and answered all questions.  Patient conveyed verbal understanding 

and agreed to the plan.  I discussed this case in detail with my attending Dr. Burk


Undiagnosed new problem with uncertain prognosis?


@  -No


Drug Therapy requiring intensive monitoring for toxicity (Heparin, Nitro, 

Insulin, Cardizem)?


@  -No


Were any procedures done?


@  -No


Diagnosis/symptom?


@  -rectal bleeding


Acute, or Chronic, or Acute on Chronic?


@  -Acute


Uncomplicated (without systemic symptoms) or Complicated (systemic symptoms)?


@  -Uncomplicated


Side effects of treatment?


@  -No


Exacerbation, Progression, or Severe Exacerbation?


@  -No


Poses a threat to life or bodily function? How? (Chest pain, USA, MI, pneumonia,

PE, COPD, DKA, ARF, appy, cholecystitis, CVA, Diverticulitis, Homicidal, 

Suicidal, threat to staff... and all critical care pts)


@  -Low likelihood








- Lab Data


Result diagrams: 


                                 10/03/23 23:59





                                 10/03/23 23:59


                                   Lab Results











  10/03/23 10/03/23 10/03/23 Range/Units





  23:59 23:59 23:59 


 


WBC  7.3    (3.8-10.6)  k/uL


 


RBC  4.66    (4.30-5.90)  m/uL


 


Hgb  14.2    (13.0-17.5)  gm/dL


 


Hct  42.3    (39.0-53.0)  %


 


MCV  90.8    (80.0-100.0)  fL


 


MCH  30.5    (25.0-35.0)  pg


 


MCHC  33.5    (31.0-37.0)  g/dL


 


RDW  12.3    (11.5-15.5)  %


 


Plt Count  221    (150-450)  k/uL


 


MPV  11.1    


 


Neutrophils %  55    %


 


Lymphocytes %  33    %


 


Monocytes %  7    %


 


Eosinophils %  3    %


 


Basophils %  1    %


 


Neutrophils #  4.0    (1.3-7.7)  k/uL


 


Lymphocytes #  2.4    (1.0-4.8)  k/uL


 


Monocytes #  0.5    (0-1.0)  k/uL


 


Eosinophils #  0.3    (0-0.7)  k/uL


 


Basophils #  0.1    (0-0.2)  k/uL


 


APTT   25.7   (22.0-30.0)  sec


 


Sodium    141  (137-145)  mmol/L


 


Potassium    4.2  (3.5-5.1)  mmol/L


 


Chloride    106  ()  mmol/L


 


Carbon Dioxide    22  (22-30)  mmol/L


 


Anion Gap    13  mmol/L


 


BUN    19  (9-20)  mg/dL


 


Creatinine    1.27 H  (0.66-1.25)  mg/dL


 


Est GFR (CKD-EPI)AfAm    74  (>60 ml/min/1.73 sqM)  


 


Est GFR (CKD-EPI)NonAf    64  (>60 ml/min/1.73 sqM)  


 


Glucose    137 H  (74-99)  mg/dL


 


Calcium    9.4  (8.4-10.2)  mg/dL


 


Total Bilirubin    0.6  (0.2-1.3)  mg/dL


 


AST    27  (17-59)  U/L


 


ALT    25  (4-49)  U/L


 


Alkaline Phosphatase    54  ()  U/L


 


Total Protein    7.4  (6.3-8.2)  g/dL


 


Albumin    4.4  (3.5-5.0)  g/dL














Disposition


Clinical Impression: 


 Rectal bleeding





Disposition: HOME SELF-CARE


Condition: Good


Instructions (If sedation given, give patient instructions):  Rectal Bleeding 

(ED), Diverticulosis (ED), Diverticulitis Diet (ED)


Additional Instructions: 


Follow-up with PCP.  Report back to ER with any new or worsening symptoms.


Is patient prescribed a controlled substance at d/c from ED?: No


Referrals: 


Jose Mancera MD [Primary Care Provider] - 1-2 days


Time of Disposition: 03:02

## 2023-10-04 NOTE — CT
EXAM:

  CT Abdomen and Pelvis With Intravenous Contrast

 

CLINICAL HISTORY:

  ITS.REASON CT Reason: rectal bleeding, LLQ pain

 

TECHNIQUE:

  Axial computed tomography images of the abdomen and pelvis with 

intravenous contrast.  CTDI is 17.7 mGy and DLP is 829.9 mGy-cm.  This CT 

exam was performed using one or more of the following dose reduction 

techniques: automated exposure control, adjustment of the mA and/or kV 

according to patient size, and/or use of iterative reconstruction 

technique.

 

COMPARISON:

  No relevant prior studies available.

 

FINDINGS:

  Lung bases:  Unremarkable.  No mass.  No consolidation.

 

 ABDOMEN:

  Liver:  innumerable intrahepatic hypodensities likely representing 

cysts.  This may be further evaluated with dedicated abdominal none.

  Gallbladder and bile ducts:  Unremarkable.  No calcified stones.  No 

ductal dilation.

  Pancreas:  Unremarkable.  No mass.  No ductal dilation.

  Spleen:  Unremarkable.  No splenomegaly.

  Adrenals:  Unremarkable.  No mass.

  Kidneys and ureters:  The kidneys opacify with and excrete contrast in 

a normal and symmetric fashion.  No hydronephrosis.

  Stomach and bowel:  Diverticulosis without evidence of diverticulitis.  

No obstruction.

 

 PELVIS:

  Appendix:  No findings to suggest acute appendicitis.

  Bladder:  Unremarkable.  No mass.

  Reproductive:  Unremarkable as visualized.

 

 ABDOMEN and PELVIS:

  Intraperitoneal space:  Unremarkable.  No free air.  No significant 

fluid collection.

  Bones/joints:  No acute fracture.  No dislocation.

  Soft tissues:  Unremarkable.

  Vasculature:  Unremarkable.  No abdominal aortic aneurysm.

  Lymph nodes:  Unremarkable.  No enlarged lymph nodes.

 

IMPRESSION:     

  No acute findings in the abdomen or pelvis.

## 2023-10-08 ENCOUNTER — HOSPITAL ENCOUNTER (INPATIENT)
Dept: HOSPITAL 47 - EC | Age: 53
LOS: 2 days | Discharge: ANOTHER HOSPITAL | DRG: 378 | End: 2023-10-10
Attending: INTERNAL MEDICINE | Admitting: INTERNAL MEDICINE
Payer: COMMERCIAL

## 2023-10-08 DIAGNOSIS — Z79.899: ICD-10-CM

## 2023-10-08 DIAGNOSIS — K29.70: ICD-10-CM

## 2023-10-08 DIAGNOSIS — Z87.891: ICD-10-CM

## 2023-10-08 DIAGNOSIS — K21.00: ICD-10-CM

## 2023-10-08 DIAGNOSIS — K64.9: ICD-10-CM

## 2023-10-08 DIAGNOSIS — K57.31: Primary | ICD-10-CM

## 2023-10-08 DIAGNOSIS — D62: ICD-10-CM

## 2023-10-08 DIAGNOSIS — G47.33: ICD-10-CM

## 2023-10-08 DIAGNOSIS — Z86.010: ICD-10-CM

## 2023-10-08 DIAGNOSIS — E78.5: ICD-10-CM

## 2023-10-08 DIAGNOSIS — K44.9: ICD-10-CM

## 2023-10-08 LAB
ALBUMIN SERPL-MCNC: 3.8 G/DL (ref 3.5–5)
ALP SERPL-CCNC: 38 U/L (ref 38–126)
ALT SERPL-CCNC: 19 U/L (ref 4–49)
ANION GAP SERPL CALC-SCNC: 11 MMOL/L
APTT BLD: 22.1 SEC (ref 22–30)
AST SERPL-CCNC: 27 U/L (ref 17–59)
BASOPHILS # BLD AUTO: 0 K/UL (ref 0–0.2)
BASOPHILS NFR BLD AUTO: 1 %
BUN SERPL-SCNC: 13 MG/DL (ref 9–20)
CALCIUM SPEC-MCNC: 8.8 MG/DL (ref 8.4–10.2)
CHLORIDE SERPL-SCNC: 105 MMOL/L (ref 98–107)
CO2 SERPL-SCNC: 21 MMOL/L (ref 22–30)
EOSINOPHIL # BLD AUTO: 0.1 K/UL (ref 0–0.7)
EOSINOPHIL NFR BLD AUTO: 1 %
ERYTHROCYTE [DISTWIDTH] IN BLOOD BY AUTOMATED COUNT: 2.31 M/UL (ref 4.3–5.9)
ERYTHROCYTE [DISTWIDTH] IN BLOOD BY AUTOMATED COUNT: 2.54 M/UL (ref 4.3–5.9)
ERYTHROCYTE [DISTWIDTH] IN BLOOD: 15.1 % (ref 11.5–15.5)
ERYTHROCYTE [DISTWIDTH] IN BLOOD: 15.4 % (ref 11.5–15.5)
GLUCOSE SERPL-MCNC: 104 MG/DL (ref 74–99)
HCT VFR BLD AUTO: 21.9 % (ref 39–53)
HCT VFR BLD AUTO: 23.5 % (ref 39–53)
HGB BLD-MCNC: 7.4 GM/DL (ref 13–17.5)
HGB BLD-MCNC: 8.2 GM/DL (ref 13–17.5)
INR PPP: 1 (ref ?–1.2)
LYMPHOCYTES # SPEC AUTO: 1 K/UL (ref 1–4.8)
LYMPHOCYTES NFR SPEC AUTO: 15 %
MCH RBC QN AUTO: 31.9 PG (ref 25–35)
MCH RBC QN AUTO: 32.2 PG (ref 25–35)
MCHC RBC AUTO-ENTMCNC: 33.7 G/DL (ref 31–37)
MCHC RBC AUTO-ENTMCNC: 34.9 G/DL (ref 31–37)
MCV RBC AUTO: 92.3 FL (ref 80–100)
MCV RBC AUTO: 94.9 FL (ref 80–100)
MONOCYTES # BLD AUTO: 0.4 K/UL (ref 0–1)
MONOCYTES NFR BLD AUTO: 6 %
NEUTROPHILS # BLD AUTO: 5 K/UL (ref 1.3–7.7)
NEUTROPHILS NFR BLD AUTO: 76 %
PLATELET # BLD AUTO: 249 K/UL (ref 150–450)
PLATELET # BLD AUTO: 253 K/UL (ref 150–450)
POTASSIUM SERPL-SCNC: 4 MMOL/L (ref 3.5–5.1)
PROT SERPL-MCNC: 6.3 G/DL (ref 6.3–8.2)
PT BLD: 10.5 SEC (ref 9–12)
SODIUM SERPL-SCNC: 137 MMOL/L (ref 137–145)
WBC # BLD AUTO: 6.6 K/UL (ref 3.8–10.6)
WBC # BLD AUTO: 8.7 K/UL (ref 3.8–10.6)

## 2023-10-08 PROCEDURE — 99285 EMERGENCY DEPT VISIT HI MDM: CPT

## 2023-10-08 PROCEDURE — 96374 THER/PROPH/DIAG INJ IV PUSH: CPT

## 2023-10-08 PROCEDURE — 88305 TISSUE EXAM BY PATHOLOGIST: CPT

## 2023-10-08 PROCEDURE — 86901 BLOOD TYPING SEROLOGIC RH(D): CPT

## 2023-10-08 PROCEDURE — 85730 THROMBOPLASTIN TIME PARTIAL: CPT

## 2023-10-08 PROCEDURE — 87635 SARS-COV-2 COVID-19 AMP PRB: CPT

## 2023-10-08 PROCEDURE — 86140 C-REACTIVE PROTEIN: CPT

## 2023-10-08 PROCEDURE — 36430 TRANSFUSION BLD/BLD COMPNT: CPT

## 2023-10-08 PROCEDURE — 96361 HYDRATE IV INFUSION ADD-ON: CPT

## 2023-10-08 PROCEDURE — 80048 BASIC METABOLIC PNL TOTAL CA: CPT

## 2023-10-08 PROCEDURE — 80053 COMPREHEN METABOLIC PANEL: CPT

## 2023-10-08 PROCEDURE — 86900 BLOOD TYPING SEROLOGIC ABO: CPT

## 2023-10-08 PROCEDURE — 83605 ASSAY OF LACTIC ACID: CPT

## 2023-10-08 PROCEDURE — 36415 COLL VENOUS BLD VENIPUNCTURE: CPT

## 2023-10-08 PROCEDURE — 45330 DIAGNOSTIC SIGMOIDOSCOPY: CPT

## 2023-10-08 PROCEDURE — 84132 ASSAY OF SERUM POTASSIUM: CPT

## 2023-10-08 PROCEDURE — 85610 PROTHROMBIN TIME: CPT

## 2023-10-08 PROCEDURE — 85025 COMPLETE CBC W/AUTO DIFF WBC: CPT

## 2023-10-08 PROCEDURE — 43239 EGD BIOPSY SINGLE/MULTIPLE: CPT

## 2023-10-08 PROCEDURE — 85027 COMPLETE CBC AUTOMATED: CPT

## 2023-10-08 PROCEDURE — 86920 COMPATIBILITY TEST SPIN: CPT

## 2023-10-08 PROCEDURE — 86850 RBC ANTIBODY SCREEN: CPT

## 2023-10-08 RX ADMIN — CEFAZOLIN SCH: 330 INJECTION, POWDER, FOR SOLUTION INTRAMUSCULAR; INTRAVENOUS at 12:37

## 2023-10-08 RX ADMIN — LORATADINE, PSEUDOEPHEDRINE SULFATE SCH EACH: 5; 120 TABLET, FILM COATED, EXTENDED RELEASE ORAL at 21:26

## 2023-10-08 RX ADMIN — MONTELUKAST SODIUM SCH MG: 10 TABLET, FILM COATED ORAL at 21:26

## 2023-10-08 RX ADMIN — PANTOPRAZOLE SODIUM SCH MG: 40 INJECTION, POWDER, FOR SOLUTION INTRAVENOUS at 13:32

## 2023-10-08 NOTE — P.HPIM
History of Present Illness


H&P Date: 10/08/23


Chief Complaint: GI bleed





* 52-year-old and pelvis with past medical history significant for obstructive 

  sleep apnea, gastroesophageal reflux disease presented to the emergency 

  department with complains of bright red blood per rectum.


* Patient states his symptom onset was 5 days ago patient had bright red blood 

  in stool.  Patient states he had a CT abdomen pelvis done on 10/4/23 which was

  negative.  He was told to take a clear liquid diet and could be secondary to 

  hemorrhoidal bleed.  Patient said her bleeding persisted and he ultimately 

  decided to come to the emergency


* Workup initiated including CBC which showed a hemoglobin of 8.2, most recent 

  hemoglobin was 14.2 checked on 10/3/23.


* Serum chemistry obtained showed sodium of 137 potassium of 4 chloride of 105, 

  next a 21 BUN 13 1.0 to lactic acid 1.2


* On call surgery team was called and they accepted the patient for colonoscopy 

  to be scheduled on 10/9.  Patient to be started on clear liquid diet and bowel

  prep








REVIEW OF SYSTEMS: Right red blood per rectum


CONSTITUTIONAL: No fever, no malaise, no fatigue. 


HEENT: No recent visual problems or hearing problems. Denied any sore throat. 


CARDIOVASCULAR: No chest pain, orthopnea, PND, no palpitations, no syncope. 


PULMONARY: No shortness of breath, no cough, no hemoptysis. 


GASTROINTESTINAL: No diarrhea, no nausea, no vomiting, no abdominal pain. 


NEUROLOGICAL: No headaches, no weakness, no numbness. 


HEMATOLOGICAL: Denies any bleeding or petechiae. 


GENITOURINARY: Denies any burning micturition, frequency, or urgency. 


MUSCULOSKELETAL/RHEUMATOLOGICAL: Denies any joint pain, swelling, or any muscle 

pain. 


ENDOCRINE: Denies any polyuria or polydipsia. 








PHYSICAL EXAMINATION: 


GENERAL: The patient is alert and oriented x3, not in any acute distress. Well 

developed, well nourished. 


HEENT: Pupils are round and equally reacting to light. EOMI. No scleral icterus.

No conjunctival pallor. Normocephalic, atraumatic. No pharyngeal erythema. No 

thyromegaly. 


CARDIOVASCULAR: S1 and S2 present. No murmurs, rubs, or gallops. 


PULMONARY: Chest is clear to auscultation, no wheezing or crackles. 


ABDOMEN: Soft, nontender, nondistended, normoactive bowel sounds. No palpable 

organomegaly. 


MUSCULOSKELETAL: No joint swelling or deformity.


EXTREMITIES: No cyanosis, clubbing, or pedal edema. 


NEUROLOGICAL: Gross neurological examination did not reveal any focal deficits. 


SKIN: No rashes. 





Past Medical History


Past Medical History: GI Bleed, Hyperlipidemia


Additional Past Medical History / Comment(s): divereticulitis


History of Any Multi-Drug Resistant Organisms: None Reported


Additional Past Surgical History / Comment(s): parathyroid surgery


Past Psychological History: No Psychological Hx Reported


Smoking Status: Former smoker


Past Alcohol Use History: Occasional


Past Drug Use History: Marijuana





Medications and Allergies


                                Home Medications











 Medication  Instructions  Recorded  Confirmed  Type


 


Loratadine-Pseudoeph  mg 1 tab PO HS 01/30/21 10/08/23 History





[Claritin-D 24 Hour]    


 


Montelukast Sodium [Singulair] 10 mg PO HS 01/30/21 10/08/23 History








                                    Allergies











Allergy/AdvReac Type Severity Reaction Status Date / Time


 


No Known Allergies Allergy   Verified 10/08/23 14:16














Physical Exam


Vitals: 


                                   Vital Signs











  Temp Pulse Resp BP Pulse Ox


 


 10/08/23 13:38   99  18  129/84  99


 


 10/08/23 10:48   94  18  115/79  100


 


 10/08/23 08:53  96.7 F L  127 H  18  115/78  100








                                Intake and Output











 10/07/23 10/08/23 10/08/23





 22:59 06:59 14:59


 


Other:   


 


  Weight   81.647 kg














Results


CBC & Chem 7: 


                                 10/08/23 10:08





                                 10/08/23 10:08


Labs: 


                  Abnormal Lab Results - Last 24 Hours (Table)











  10/08/23 10/08/23 Range/Units





  10:08 10:08 


 


RBC  2.54 L   (4.30-5.90)  m/uL


 


Hgb  8.2 L D   (13.0-17.5)  gm/dL


 


Hct  23.5 L   (39.0-53.0)  %


 


Carbon Dioxide   21 L  (22-30)  mmol/L


 


Glucose   104 H  (74-99)  mg/dL














Thrombosis Risk Factor Assmnt





- DVT/VTE Prophylaxis


DVT/VTE Prophylaxis: Mechanical Prophylaxis ordered





Assessment and Plan


Assessment: 








Assessment and plan





* Bright red blood per rectum suspect lower gastrointestinal bleed


* Acute blood loss anemia


* History of seasonal ALLERGIES





* In regards to blood loss anemia, lower GI bleed H&H ordered every 6 hours/type

  and screen ordered


* Transfuse if hemoglobin less than 7 on hemodynamically unstable


* Continue patient on IV Protonix


* Continue clear liquid diet, general surgery consulted plan for colonoscopy 

  tomorrow


* CODE STATUS is full code

## 2023-10-08 NOTE — ED
GI Bleed HPI





- General


Chief complaint: GI Bleed


Stated complaint: GI Bleed


Time Seen by Provider: 10/08/23 09:32


Source: patient, RN notes reviewed


Mode of arrival: wheelchair


Limitations: no limitations





- History of Present Illness


Initial comments: 


This is a 52-year-old male who presents to the emergency department for rectal 

bleeding.  Symptoms started 5 days ago.  He was evaluated here 5 days ago when 

symptoms began. This is described as bright red blood.  Not taking any blood 

thinners. He had negative blood work and negative imaging of the abdomen and 

pelvis and was discharged home with instructions to follow a clear liquid diet. 

He was advised that this may be due to an internal hemorrhoid or diverticulosis.

 States that the bleeding has persisted and worsened.  It was initially only 

present when he was having bowel movements and was mixed in with the stool.  He 

is now going to the bathroom and essentially only expelling blood into the 

toilet without stool mixed in. He continues to deny any abdominal pain, nausea, 

or vomiting.  However, he feels weak and generally unwell.





Denies any fevers, chills, sore throat, cough, dyspnea, chest pain, 

palpitations, abdominal pain, nausea, vomiting, back pain, or headaches.





MD complaint: blood streaked stool, gross hematochezia





- Related Data


                                Home Medications











 Medication  Instructions  Recorded  Confirmed


 


Loratadine-Pseudoeph  mg 1 tab PO HS 01/30/21 10/08/23





[Claritin-D 24 Hour]   


 


Montelukast Sodium [Singulair] 10 mg PO HS 01/30/21 10/08/23











                                    Allergies











Allergy/AdvReac Type Severity Reaction Status Date / Time


 


No Known Allergies Allergy   Verified 10/08/23 14:16














Review of Systems


ROS Statement: 


Those systems with pertinent positive or pertinent negative responses have been 

documented in the HPI.





ROS Other: All systems not noted in ROS Statement are negative.





Past Medical History


Past Medical History: GI Bleed, Hyperlipidemia


Additional Past Medical History / Comment(s): divereticulitis


History of Any Multi-Drug Resistant Organisms: None Reported


Additional Past Surgical History / Comment(s): parathyroid surgery


Past Psychological History: No Psychological Hx Reported


Smoking Status: Former smoker


Past Alcohol Use History: Occasional


Past Drug Use History: Marijuana





General Exam


Limitations: no limitations


General appearance: alert, in no apparent distress


Head exam: Present: atraumatic, normocephalic, normal inspection


Respiratory exam: Present: normal lung sounds bilaterally.  Absent: respiratory 

distress, wheezes, rales, rhonchi, stridor


Cardiovascular Exam: Present: regular rate, normal rhythm, normal heart sounds. 

 Absent: systolic murmur, diastolic murmur, rubs, gallop, clicks


GI/Abdominal exam: Present: soft, normal bowel sounds.  Absent: distended, 

tenderness, guarding, rebound, rigid


Rectal exam: Present: normal inspection


Neurological exam: Present: alert, oriented X3, CN II-XII intact


Psychiatric exam: Present: normal affect, normal mood


Skin exam: Present: warm, dry, intact, normal color.  Absent: rash





Course


                                   Vital Signs











  10/08/23 10/08/23 10/08/23





  08:53 10:48 13:38


 


Temperature 96.7 F L  


 


Pulse Rate 127 H 94 99


 


Respiratory 18 18 18





Rate   


 


Blood Pressure 115/78 115/79 129/84


 


O2 Sat by Pulse 100 100 99





Oximetry   














Medical Decision Making





- Medical Decision Making


This is a 52-year-old male who presents to the emergency department for rectal 

bleeding.





Was pt. sent in by a medical professional or institution?


@  -No   


Did you speak to anyone other than the patient for history?  


@  -No


Did you review nursing and triage notes? 


@  -Yes, and I agree, it is accurate with regards to the patient's symptoms.


Were old charts reviewed? 


@  -Computed tomography scan of the abdomen and pelvis from 10/4 demonstrating 

diverticulosis without any acute process.


Differential Diagnosis? 


@  -Differential GI Bleed:


Esophageal varices, aortoenteric fistula, Aurora-Ramso, gastritis, peptic ulcer

 disease, diverticulosis, inflammatory bowel disease, hemorrhoids, fissure, 

colitis, malignancy, Meckels diverticulum, this is not meant to be an all-

inclusive list.


EKG interpreted by me (3pts min.)?


@  -Not obtained


X-rays interpreted by me (1pt min.)?


@  -Not obtained


CT interpreted by me (1pt min.)?


@  -Not obtained


U/S interpreted by me (1pt. min.)?


@  -Not obtained


What testing was considered but not performed? (CT, X-rays, U/S, labs)? Why?


@  -None


What meds were considered but not given? Why?


@  -None


Did you discuss the management of the patient with other professionals?


@  -Yes, Dr. Zhu, who advised fluids, GoLYTELY prep, and keeping the 

patient NPO after midnight for possible colonoscopy in the morning. Dr. Harrington 

accepts the patient for admission to medicine.


Did you reconcile home meds?


@  -Yes


Was smoking cessation discussed for >3mins.?


@  -No


Was critical care preformed (if so, how long)?


@  -No


Were there social determinants of health that impacted care today? How? 

(Homelessness, low income, unemployed, alcoholism, drug addiction, 

transportation, low edu. Level, literacy, decrease access to med. care, prison, 

rehab)?


@  -No


Was there de-escalation of care discussed even if they declined? (Discuss DNR or

 withdrawal of care, Hospice)?


@  -No


What co-morbidities impacted this encounter? (DM, HTN, Smoking, COPD, CAD, 

Cancer, CVA, Hep., AIDS, mental health diagnosis, sleep apnea, morbid obesity)?


@  -Diverticulosis


Was patient admitted / discharged?


@  -Admitted. Lab work obtained revealing a hemoglobin of 8.2.  This has 

decreased when compared with 4-5 days ago when it was 14.2. Lab work was 

otherwise nonactionable.  Computed tomography scan of the abdomen and pelvis 

from 10/4 demonstrated diverticulosis with no acute findings.  I spoke with Dr. Zhu, general surgery, regarding the patient.  He is comfortable managing a 

lower GI bleed here at this time.  He advised fluids and a GoLYTELY prep with 

tentative plan for colonoscopy in the morning.  Patient will be kept NPO after 

midnight.  Patient admitted to medicine with GI as consult.  Will plan to get 

serial hemoglobins as well.


Undiagnosed new problem with uncertain prognosis?


@  -None


Drug Therapy requiring intensive monitoring for toxicity (Heparin, Nitro, 

Insulin, Cardizem)?


@  -None


Were any procedures done?


@  -None


Diagnosis/symptom?


@  -Gross hematochezia, acute blood loss anemia


Acute, or Chronic, or Acute on Chronic?


@  -Acute


Uncomplicated (without systemic symptoms) or Complicated (systemic symptoms)?


@  -Uncomplicated


Side effects of treatment?


@  -None


Exacerbation, Progression, or Severe Exacerbation]


@  -Not applicable


Poses a threat to life or bodily function?


@  -Yes, if the bleeding persists it can become a life threatening issue. 





This case was discussed in detail with the attending ED physician, Dr. Brown. 

Presentation, findings, and treatment plan discussed in detail as well. 








- Lab Data


Result diagrams: 


                                 10/08/23 15:42





                                 10/08/23 10:08


                                   Lab Results











  10/08/23 10/08/23 10/08/23 Range/Units





  10:08 10:08 10:08 


 


WBC  6.6    (3.8-10.6)  k/uL


 


RBC  2.54 L    (4.30-5.90)  m/uL


 


Hgb  8.2 L D    (13.0-17.5)  gm/dL


 


Hct  23.5 L    (39.0-53.0)  %


 


MCV  92.3    (80.0-100.0)  fL


 


MCH  32.2    (25.0-35.0)  pg


 


MCHC  34.9    (31.0-37.0)  g/dL


 


RDW  15.1    (11.5-15.5)  %


 


Plt Count  249    (150-450)  k/uL


 


MPV  10.1    


 


Neutrophils %  76    %


 


Lymphocytes %  15    %


 


Monocytes %  6    %


 


Eosinophils %  1    %


 


Basophils %  1    %


 


Neutrophils #  5.0    (1.3-7.7)  k/uL


 


Lymphocytes #  1.0    (1.0-4.8)  k/uL


 


Monocytes #  0.4    (0-1.0)  k/uL


 


Eosinophils #  0.1    (0-0.7)  k/uL


 


Basophils #  0.0    (0-0.2)  k/uL


 


PT   10.5   (9.0-12.0)  sec


 


INR   1.0   (<1.2)  


 


APTT   22.1   (22.0-30.0)  sec


 


Sodium    137  (137-145)  mmol/L


 


Potassium    4.0  (3.5-5.1)  mmol/L


 


Chloride    105  ()  mmol/L


 


Carbon Dioxide    21 L  (22-30)  mmol/L


 


Anion Gap    11  mmol/L


 


BUN    13  (9-20)  mg/dL


 


Creatinine    1.02  (0.66-1.25)  mg/dL


 


Est GFR (CKD-EPI)AfAm    >90  (>60 ml/min/1.73 sqM)  


 


Est GFR (CKD-EPI)NonAf    83  (>60 ml/min/1.73 sqM)  


 


Glucose    104 H  (74-99)  mg/dL


 


Plasma Lactic Acid Alejandro     (0.7-2.0)  mmol/L


 


Calcium    8.8  (8.4-10.2)  mg/dL


 


Total Bilirubin    0.5  (0.2-1.3)  mg/dL


 


AST    27  (17-59)  U/L


 


ALT    19  (4-49)  U/L


 


Alkaline Phosphatase    38  ()  U/L


 


C-Reactive Protein    <0.5  (<1.0)  mg/dL


 


Total Protein    6.3  (6.3-8.2)  g/dL


 


Albumin    3.8  (3.5-5.0)  g/dL














  10/08/23 Range/Units





  10:08 


 


WBC   (3.8-10.6)  k/uL


 


RBC   (4.30-5.90)  m/uL


 


Hgb   (13.0-17.5)  gm/dL


 


Hct   (39.0-53.0)  %


 


MCV   (80.0-100.0)  fL


 


MCH   (25.0-35.0)  pg


 


MCHC   (31.0-37.0)  g/dL


 


RDW   (11.5-15.5)  %


 


Plt Count   (150-450)  k/uL


 


MPV   


 


Neutrophils %   %


 


Lymphocytes %   %


 


Monocytes %   %


 


Eosinophils %   %


 


Basophils %   %


 


Neutrophils #   (1.3-7.7)  k/uL


 


Lymphocytes #   (1.0-4.8)  k/uL


 


Monocytes #   (0-1.0)  k/uL


 


Eosinophils #   (0-0.7)  k/uL


 


Basophils #   (0-0.2)  k/uL


 


PT   (9.0-12.0)  sec


 


INR   (<1.2)  


 


APTT   (22.0-30.0)  sec


 


Sodium   (137-145)  mmol/L


 


Potassium   (3.5-5.1)  mmol/L


 


Chloride   ()  mmol/L


 


Carbon Dioxide   (22-30)  mmol/L


 


Anion Gap   mmol/L


 


BUN   (9-20)  mg/dL


 


Creatinine   (0.66-1.25)  mg/dL


 


Est GFR (CKD-EPI)AfAm   (>60 ml/min/1.73 sqM)  


 


Est GFR (CKD-EPI)NonAf   (>60 ml/min/1.73 sqM)  


 


Glucose   (74-99)  mg/dL


 


Plasma Lactic Acid Alejandro  1.2  (0.7-2.0)  mmol/L


 


Calcium   (8.4-10.2)  mg/dL


 


Total Bilirubin   (0.2-1.3)  mg/dL


 


AST   (17-59)  U/L


 


ALT   (4-49)  U/L


 


Alkaline Phosphatase   ()  U/L


 


C-Reactive Protein   (<1.0)  mg/dL


 


Total Protein   (6.3-8.2)  g/dL


 


Albumin   (3.5-5.0)  g/dL














Disposition


Clinical Impression: 


 Hematochezia, Acute blood loss anemia





Disposition: ADMITTED AS IP TO THIS HOSP

## 2023-10-09 LAB
ALBUMIN SERPL-MCNC: 3.1 G/DL (ref 3.5–5)
ALP SERPL-CCNC: 30 U/L (ref 38–126)
ALT SERPL-CCNC: 16 U/L (ref 4–49)
ANION GAP SERPL CALC-SCNC: 11 MMOL/L
ANION GAP SERPL CALC-SCNC: 8 MMOL/L
APTT BLD: 24.8 SEC (ref 22–30)
AST SERPL-CCNC: 29 U/L (ref 17–59)
BASOPHILS # BLD AUTO: 0 K/UL (ref 0–0.2)
BASOPHILS # BLD AUTO: 0 K/UL (ref 0–0.2)
BASOPHILS NFR BLD AUTO: 0 %
BASOPHILS NFR BLD AUTO: 0 %
BUN SERPL-SCNC: 5 MG/DL (ref 9–20)
BUN SERPL-SCNC: 8 MG/DL (ref 9–20)
CALCIUM SPEC-MCNC: 7.7 MG/DL (ref 8.4–10.2)
CALCIUM SPEC-MCNC: 8.1 MG/DL (ref 8.4–10.2)
CHLORIDE SERPL-SCNC: 106 MMOL/L (ref 98–107)
CHLORIDE SERPL-SCNC: 110 MMOL/L (ref 98–107)
CO2 SERPL-SCNC: 18 MMOL/L (ref 22–30)
CO2 SERPL-SCNC: 18 MMOL/L (ref 22–30)
EOSINOPHIL # BLD AUTO: 0.1 K/UL (ref 0–0.7)
EOSINOPHIL # BLD AUTO: 0.2 K/UL (ref 0–0.7)
EOSINOPHIL NFR BLD AUTO: 1 %
EOSINOPHIL NFR BLD AUTO: 3 %
ERYTHROCYTE [DISTWIDTH] IN BLOOD BY AUTOMATED COUNT: 2 M/UL (ref 4.3–5.9)
ERYTHROCYTE [DISTWIDTH] IN BLOOD BY AUTOMATED COUNT: 2.75 M/UL (ref 4.3–5.9)
ERYTHROCYTE [DISTWIDTH] IN BLOOD BY AUTOMATED COUNT: 3.24 M/UL (ref 4.3–5.9)
ERYTHROCYTE [DISTWIDTH] IN BLOOD BY AUTOMATED COUNT: 3.42 M/UL (ref 4.3–5.9)
ERYTHROCYTE [DISTWIDTH] IN BLOOD: 14.9 % (ref 11.5–15.5)
ERYTHROCYTE [DISTWIDTH] IN BLOOD: 15.5 % (ref 11.5–15.5)
ERYTHROCYTE [DISTWIDTH] IN BLOOD: 15.6 % (ref 11.5–15.5)
ERYTHROCYTE [DISTWIDTH] IN BLOOD: 16.2 % (ref 11.5–15.5)
GLUCOSE BLD-MCNC: 151 MG/DL (ref 70–110)
GLUCOSE BLD-MCNC: 62 MG/DL (ref 70–110)
GLUCOSE SERPL-MCNC: 122 MG/DL (ref 74–99)
GLUCOSE SERPL-MCNC: 78 MG/DL (ref 74–99)
HCT VFR BLD AUTO: 18.7 % (ref 39–53)
HCT VFR BLD AUTO: 25.8 % (ref 39–53)
HCT VFR BLD AUTO: 30 % (ref 39–53)
HCT VFR BLD AUTO: 31.6 % (ref 39–53)
HGB BLD-MCNC: 10.4 GM/DL (ref 13–17.5)
HGB BLD-MCNC: 10.8 GM/DL (ref 13–17.5)
HGB BLD-MCNC: 6.3 GM/DL (ref 13–17.5)
HGB BLD-MCNC: 8.8 GM/DL (ref 13–17.5)
INR PPP: 1.1 (ref ?–1.2)
LYMPHOCYTES # SPEC AUTO: 1.3 K/UL (ref 1–4.8)
LYMPHOCYTES # SPEC AUTO: 1.6 K/UL (ref 1–4.8)
LYMPHOCYTES NFR SPEC AUTO: 15 %
LYMPHOCYTES NFR SPEC AUTO: 22 %
MCH RBC QN AUTO: 31.5 PG (ref 25–35)
MCH RBC QN AUTO: 31.7 PG (ref 25–35)
MCH RBC QN AUTO: 32 PG (ref 25–35)
MCH RBC QN AUTO: 32.1 PG (ref 25–35)
MCHC RBC AUTO-ENTMCNC: 33.6 G/DL (ref 31–37)
MCHC RBC AUTO-ENTMCNC: 34.2 G/DL (ref 31–37)
MCHC RBC AUTO-ENTMCNC: 34.3 G/DL (ref 31–37)
MCHC RBC AUTO-ENTMCNC: 34.5 G/DL (ref 31–37)
MCV RBC AUTO: 92.3 FL (ref 80–100)
MCV RBC AUTO: 92.8 FL (ref 80–100)
MCV RBC AUTO: 93.6 FL (ref 80–100)
MCV RBC AUTO: 94 FL (ref 80–100)
MONOCYTES # BLD AUTO: 0.5 K/UL (ref 0–1)
MONOCYTES # BLD AUTO: 0.5 K/UL (ref 0–1)
MONOCYTES NFR BLD AUTO: 6 %
MONOCYTES NFR BLD AUTO: 6 %
NEUTROPHILS # BLD AUTO: 4.8 K/UL (ref 1.3–7.7)
NEUTROPHILS # BLD AUTO: 6.8 K/UL (ref 1.3–7.7)
NEUTROPHILS NFR BLD AUTO: 67 %
NEUTROPHILS NFR BLD AUTO: 77 %
PLATELET # BLD AUTO: 155 K/UL (ref 150–450)
PLATELET # BLD AUTO: 214 K/UL (ref 150–450)
PLATELET # BLD AUTO: 223 K/UL (ref 150–450)
PLATELET # BLD AUTO: 260 K/UL (ref 150–450)
POTASSIUM SERPL-SCNC: 3.9 MMOL/L (ref 3.5–5.1)
POTASSIUM SERPL-SCNC: 5.7 MMOL/L (ref 3.5–5.1)
PROT SERPL-MCNC: 5.1 G/DL (ref 6.3–8.2)
PT BLD: 11 SEC (ref 9–12)
SODIUM SERPL-SCNC: 135 MMOL/L (ref 137–145)
SODIUM SERPL-SCNC: 136 MMOL/L (ref 137–145)
WBC # BLD AUTO: 10.4 K/UL (ref 3.8–10.6)
WBC # BLD AUTO: 11.3 K/UL (ref 3.8–10.6)
WBC # BLD AUTO: 7.2 K/UL (ref 3.8–10.6)
WBC # BLD AUTO: 8.8 K/UL (ref 3.8–10.6)

## 2023-10-09 PROCEDURE — 0DJD8ZZ INSPECTION OF LOWER INTESTINAL TRACT, VIA NATURAL OR ARTIFICIAL OPENING ENDOSCOPIC: ICD-10-PCS

## 2023-10-09 PROCEDURE — 0DB78ZX EXCISION OF STOMACH, PYLORUS, VIA NATURAL OR ARTIFICIAL OPENING ENDOSCOPIC, DIAGNOSTIC: ICD-10-PCS

## 2023-10-09 PROCEDURE — 0DB58ZX EXCISION OF ESOPHAGUS, VIA NATURAL OR ARTIFICIAL OPENING ENDOSCOPIC, DIAGNOSTIC: ICD-10-PCS

## 2023-10-09 PROCEDURE — 30233N1 TRANSFUSION OF NONAUTOLOGOUS RED BLOOD CELLS INTO PERIPHERAL VEIN, PERCUTANEOUS APPROACH: ICD-10-PCS

## 2023-10-09 RX ADMIN — CEFAZOLIN SCH MLS/HR: 330 INJECTION, POWDER, FOR SOLUTION INTRAMUSCULAR; INTRAVENOUS at 09:22

## 2023-10-09 RX ADMIN — LORATADINE, PSEUDOEPHEDRINE SULFATE SCH: 5; 120 TABLET, FILM COATED, EXTENDED RELEASE ORAL at 09:11

## 2023-10-09 RX ADMIN — MONTELUKAST SODIUM SCH: 10 TABLET, FILM COATED ORAL at 20:37

## 2023-10-09 RX ADMIN — LORATADINE, PSEUDOEPHEDRINE SULFATE SCH: 5; 120 TABLET, FILM COATED, EXTENDED RELEASE ORAL at 20:37

## 2023-10-09 RX ADMIN — PANTOPRAZOLE SODIUM SCH MG: 40 INJECTION, POWDER, FOR SOLUTION INTRAVENOUS at 09:22

## 2023-10-09 NOTE — P.OP
Date of Procedure: 10/09/23


Preoperative Diagnosis: 


GI bleed


Postoperative Diagnosis: 


Antral gastritis





Hiatal hernia





Mild esophagitis





Presumed diverticular bleeding


Procedure(s) Performed: 


EGD





Colonoscopy


Anesthesia: MAC


Surgeon: Terrance Zhu


Pathology: other (Antrum, esophagus)


Condition: critical


Description of Procedure: 


The patient's placed on the endoscopy table in the lateral position.  He 

received IV sedation.  The gastroscope placed oropharynx.  And was then placed 

in the esophagus and the stomach.  Scope was placed through the pylorus and the 

duodenum.  The first and second portion of the duodenum appeared normal.  Scope 

was then brought back the antrum this appeared inflamed.  A biopsies performed. 

Scope was unretroflexed and remainder the stomach appeared normal.  Patient had 

a sliding hiatal hernia.  The GE junction was at 38 cm.  The distal esophagus 

appeared mildly inflamed.  The proximal esophagus appeared normal.  Scope w

ithdrawn for patient.





Next, digital rectal exam was performed.  There was a large amount of blood in 

the anus.  The flexible colonoscope was then placed patient anus and into the 

rectum.  There was a large amount of blood in the rectum.  The scope was 

advanced into the sigmoid colon there is a large amount of blood and clots.  The

scope could not be advanced beyond this due to poor visibility.  At this point 

scope withdrawn.  A large amount of blood was evacuated.  Patient was sent to 

the ICU in guarded condition.  He'll receive 2 units of packed red cells.  He'll

also scheduled for a tagged RBC scan

## 2023-10-09 NOTE — P.CNPUL
History of Present Illness


Consult date: 10/09/23


Requesting physician: Hesham Harrington


Reason for consult: other (ICU management)


Chief complaint: Bright red blood per rectum


History of present illness: 





This is a 52-year-old white male with history of diverticulosis and hemorrhoids,

his last colonoscopy was about 6 years ago, and he had polyps removed.  For the 

last 5 days, the patient has been noticing bright red blood per rectum.  Not 

associated with any nausea or vomiting, and no hematemesis.  No abdominal pain, 

he was in the ER last on 10//2023, and workup was basically unremarkable, 

hemoglobin was 14.2.  Since then the hemoglobin dropped down to 6.3, patient 

received 2 units of packed RBCs in the ER, he was seen by GI, and he is mariah

eduled to undergo colonoscopy later this afternoon.  Repeat hemoglobin after 2 

units of packed RBCs were given came up to 10.8.  The rest of the labs were 

basically unremarkable except for slightly elevated potassium of 5.7.  

Considering the hemoglobin being as low as it is this consult was initiated, and

I admitted the patient to the ICU.





Review of Systems


CONSTITUTIONAL: Denies fever or chills.


HEENT: Denies blurred vision, vision changes, or eye pain. Denies hemoptysis 


CARDIOVASCULAR: Denies chest pain or pressure.


RESPIRATORY: No shortness of breath. 


GASTROINTESTINAL: As noted in HPI


HEMATOLOGIC: Denies bleeding disorders.


GENITOURINARY:  Denies any blood in urine or increased urinary frequency.  


SKIN: Denies pruitis. Denies rash.


Psychiatric: Negative


Neurologic: Negative














Past Medical History


Past Medical History: GI Bleed, Hyperlipidemia


Additional Past Medical History / Comment(s): divereticulitis


History of Any Multi-Drug Resistant Organisms: None Reported


Additional Past Surgical History / Comment(s): parathyroid surgery


Past Anesthesia/Blood Transfusion Reactions: No Reported Reaction


Past Psychological History: No Psychological Hx Reported


Smoking Status: Former smoker


Past Alcohol Use History: Occasional


Past Drug Use History: Marijuana





Medications and Allergies


                                Home Medications











 Medication  Instructions  Recorded  Confirmed  Type


 


Loratadine-Pseudoeph  mg 1 tab PO HS 01/30/21 10/08/23 History





[Claritin-D 24 Hour]    


 


Montelukast Sodium [Singulair] 10 mg PO HS 01/30/21 10/08/23 History








                                    Allergies











Allergy/AdvReac Type Severity Reaction Status Date / Time


 


No Known Allergies Allergy   Verified 10/08/23 14:16














Physical Exam


Vitals: 


                                   Vital Signs











  Temp Pulse Pulse Resp BP BP Pulse Ox


 


 10/09/23 13:00   89   12  133/84   98


 


 10/09/23 12:20   95   15  133/84   97


 


 10/09/23 12:10   102 H   16    97


 


 10/09/23 12:00  97.7 F  85   12  144/86   98


 


 10/09/23 11:54     43 H   


 


 10/09/23 09:09  98.3 F  93   16  134/84   100


 


 10/09/23 08:00  98.2 F   88  16   126/88  100


 


 10/09/23 07:00  98.2 F  76   18  122/76   98


 


 10/09/23 06:32  98.2 F  70   18  126/88   100


 


 10/09/23 06:12  98.2 F  81   18  133/80  


 


 10/09/23 06:04  98.2 F  77   18  126/88  


 


 10/09/23 05:55  98.4 F  76   18  126/73  


 


 10/09/23 05:53  98.2 F  76   18  126/73  


 


 10/09/23 05:43  98.2 F  76   18  126/73  


 


 10/09/23 04:50  97.9 F  85   18  135/75   100


 


 10/09/23 04:30  98.3 F  90   18  128/78   99


 


 10/09/23 04:19  97.8 F  93   16  154/83   98


 


 10/09/23 04:12  97.8 F  116 H   18  154/83   98


 


 10/09/23 02:13  97.8 F  78   18  136/83   99


 


 10/08/23 21:29   87   20  131/76   100


 


 10/08/23 18:00  98.0 F  78   20  128/79   99








                                Intake and Output











 10/08/23 10/09/23 10/09/23





 22:59 06:59 14:59


 


Intake Total  310 420


 


Balance  310 420


 


Intake:   


 


  IV   110


 


    Invasive Line 1   30


 


    Sodium Chloride 0.9% 1,   80





    000 ml @ 20 mls/hr IV .   





    Q24H [facility] Rx#:[id number]   


 


  Blood Product  310 310


 


    Rc As-1  Unit  310 





    [id number]   


 


    Rc As-1  Unit  0 310





    [id number]   


 


Other:   


 


  Voiding Method   Urinal


 


  # Voids   0


 


  # Bowel Movements   0


 


  Weight   81.647 kg














Physical Exam: Revealed 52-year-old male in no distress


Head: Atraumatic normocephalic


HEENT:[Neck is supple.] [No neck masses.] [No thyromegaly.] [No JVD.]


Chest: [Clear throughout, no crackles, no rhonchi, no wheezes.]


Cardiac Exam: [Normal S1 and S2, no S3 gallop, no murmur.]


Abdomen: [Soft, nontender,  no megaly, no rebound, no guarding, normal bowel 

sounds.]


Extremities: [No clubbing, no edema, no cyanosis.]


Neurological Exam: [No focal neurologic deficit.]  Alert oriented 3


Psychiatric: Normal mood affect and normal mental status examination.


Skin: No rashes.





Results





- Laboratory Findings


CBC and BMP: 


                                 10/09/23 11:00





                                 10/09/23 11:00


PT/INR, D-dimer











PT  10.5 sec (9.0-12.0)   10/08/23  10:08    


 


INR  1.0  (<1.2)   10/08/23  10:08    








Abnormal lab findings: 


                                  Abnormal Labs











  10/08/23 10/08/23 10/08/23





  10:08 10:08 15:42


 


RBC  2.54 L   2.31 L


 


Hgb  8.2 L D   7.4 L


 


Hct  23.5 L   21.9 L


 


RDW   


 


Sodium   


 


Potassium   


 


Carbon Dioxide   21 L 


 


BUN   


 


Glucose   104 H 


 


POC Glucose (mg/dL)   


 


Calcium   


 


Crossmatch   














  10/08/23 10/09/23 10/09/23





  15:46 00:50 11:00


 


RBC   2.00 L  3.42 L


 


Hgb   6.3 L*  10.8 L D


 


Hct   18.7 L*  31.6 L


 


RDW   16.2 H 


 


Sodium   


 


Potassium   


 


Carbon Dioxide   


 


BUN   


 


Glucose   


 


POC Glucose (mg/dL)   


 


Calcium   


 


Crossmatch  See Detail  














  10/09/23 10/09/23 10/09/23





  11:00 11:54 12:04


 


RBC   


 


Hgb   


 


Hct   


 


RDW   


 


Sodium  135 L  


 


Potassium  5.7 H  


 


Carbon Dioxide  18 L  


 


BUN  8 L  


 


Glucose  122 H  


 


POC Glucose (mg/dL)   62 L  151 H


 


Calcium  8.1 L  


 


Crossmatch   














Assessment and Plan


Assessment: 





Impression:


Acute lower GI bleeding with bright red blood per rectum


Acute blood loss anemia


Seasonal ALLERGIC rhinitis





Recommendation:


Agree with colonoscopy


Agree with blood transfusion since the patient had a significant drop in 

hemoglobin over the last 1 week.


Agree with managing the patient in the ICU for the next 24 hours


We will continue to follow 


continue Protonix


Resume home meds








Time with Patient: Greater than 30

## 2023-10-09 NOTE — P.GSCN
History of Present Illness


Consult date: 10/09/23


History of present illness: 





CHIEF COMPLAINT: GI Bleed





HISTORY OF PRESENT ILLNESS: This is a 52-year-old male who presented to the ER 

with concerns for rectal bleeding.  Patient does have a history of 

diverticulosis and hemorrhoids.  He reports his last colonoscopy 6 years ago and

had colon polyps removed.  He reports the blood have been more bright red blood 

for about 5 days.  He reports at times there had been a large amount of blood.  

He denies any abdominal pain.  Denies any nausea or vomiting.  Initially came 

into the ER on 10/04/2023 and CAT scan done at that time each in no acute 

findings in the abdomen or pelvis.  It did report diverticulosis.  Patient d

ischarged from ER and was told to return if symptoms worsen.  And patient 

reports that the bleeding did worsen.  He came back to the ER with a hemoglobin 

of 8.2.  On 10/03/2023 hemoglobin was 14.2.  Hemoglobin today has dropped to 6.3

and he has received 2 units of blood.  Patient did undergo the GoLYTELY bowel 

prep yesterday.  He denies being on any blood thinners.





PAST MEDICAL HISTORY: 


GI bleed, diverticulosis, hyperlipidemia





PAST SURGICAL HISTORY: 


Parathyroid surgery





MEDICATIONS: 


See below





ALLERGIES: 


See below





SOCIAL HISTORY: No illicit drug use.  





REVIEW OF SYSTEMS: 


CONSTITUTIONAL: Denies fever or chills.


HEENT: Denies blurred vision, vision changes, or eye pain. Denies hemoptysis 


CARDIOVASCULAR: Denies chest pain or pressure.


RESPIRATORY: No shortness of breath. 


GASTROINTESTINAL: See HPI for pertinent findings


HEMATOLOGIC: Denies bleeding disorders.


GENITOURINARY:  Denies any blood in urine or increased urinary frequency.  


SKIN: Denies pruitis. Denies rash.





PHYSICAL EXAM: 


VITAL SIGNS: Reviewed


GENERAL: Well-developed in no acute distress. 


HEENT:  No sclera icterus. Extraocular movements grossly intact.  Moist buccal 

mucosa. Head is atraumatic, normocephalic. No nasal drainage.


ABDOMEN:  Soft.  Nondistended.  Nontender


NEUROLOGIC:  Alert and oriented.  Cranial nerves II through XII grossly intact.





LABORATORY DATA:


WBC 7.2 Hgb 6.3 platelets 223


Sodium 137 potassium 4.0 creatinine 1.02





IMAGING:


Computed tomography scan abdomen and pelvis from 10/04/2023 reports no acute 

findings





ASSESSMENT: 


1.  Acute GI bleed with bright red blood per rectum


2.  Acute blood loss anemia requiring blood transfusion


3.  History of diverticulosis


4.  History of hemorrhoids





PLAN: 


-Patient scheduled for colonoscopy today with Dr. mohamud


-Keep patient nothing by mouth


-Continue IV fluids


-Continue to monitor hemoglobin


-Continue monitoring for any signs or symptoms of bleeding





Physician Assistant note has been reviewed by physician. Signing provider agrees

with the documented findings, assessment, and plan of care. 





Past Medical History


Past Medical History: GI Bleed, Hyperlipidemia


Additional Past Medical History / Comment(s): divereticulitis


History of Any Multi-Drug Resistant Organisms: None Reported


Additional Past Surgical History / Comment(s): parathyroid surgery


Past Psychological History: No Psychological Hx Reported


Smoking Status: Former smoker


Past Alcohol Use History: Occasional


Past Drug Use History: Marijuana





Medications and Allergies


                                Home Medications











 Medication  Instructions  Recorded  Confirmed  Type


 


Loratadine-Pseudoeph  mg 1 tab PO HS 01/30/21 10/08/23 History





[Claritin-D 24 Hour]    


 


Montelukast Sodium [Singulair] 10 mg PO HS 01/30/21 10/08/23 History








                                    Allergies











Allergy/AdvReac Type Severity Reaction Status Date / Time


 


No Known Allergies Allergy   Verified 10/08/23 14:16














Surgical - Exam


                                   Vital Signs











Temp Pulse Resp BP Pulse Ox


 


 96.7 F L  127 H  18   115/78   100 


 


 10/08/23 08:53  10/08/23 08:53  10/08/23 08:53  10/08/23 08:53  10/08/23 08:53














Results





- Labs





                                 10/09/23 00:50





                                 10/08/23 10:08


                  Abnormal Lab Results - Last 24 Hours (Table)











  10/08/23 10/08/23 10/08/23 Range/Units





  10:08 10:08 15:42 


 


RBC  2.54 L   2.31 L  (4.30-5.90)  m/uL


 


Hgb  8.2 L D   7.4 L  (13.0-17.5)  gm/dL


 


Hct  23.5 L   21.9 L  (39.0-53.0)  %


 


RDW     (11.5-15.5)  %


 


Carbon Dioxide   21 L   (22-30)  mmol/L


 


Glucose   104 H   (74-99)  mg/dL


 


Crossmatch     














  10/08/23 10/09/23 Range/Units





  15:46 00:50 


 


RBC   2.00 L  (4.30-5.90)  m/uL


 


Hgb   6.3 L*  (13.0-17.5)  gm/dL


 


Hct   18.7 L*  (39.0-53.0)  %


 


RDW   16.2 H  (11.5-15.5)  %


 


Carbon Dioxide    (22-30)  mmol/L


 


Glucose    (74-99)  mg/dL


 


Crossmatch  See Detail   








                                 Diabetes panel











  10/08/23 Range/Units





  10:08 


 


Sodium  137  (137-145)  mmol/L


 


Potassium  4.0  (3.5-5.1)  mmol/L


 


Chloride  105  ()  mmol/L


 


Carbon Dioxide  21 L  (22-30)  mmol/L


 


BUN  13  (9-20)  mg/dL


 


Creatinine  1.02  (0.66-1.25)  mg/dL


 


Glucose  104 H  (74-99)  mg/dL


 


Calcium  8.8  (8.4-10.2)  mg/dL


 


AST  27  (17-59)  U/L


 


ALT  19  (4-49)  U/L


 


Alkaline Phosphatase  38  ()  U/L


 


Total Protein  6.3  (6.3-8.2)  g/dL


 


Albumin  3.8  (3.5-5.0)  g/dL








                                  Calcium panel











  10/08/23 Range/Units





  10:08 


 


Calcium  8.8  (8.4-10.2)  mg/dL


 


Albumin  3.8  (3.5-5.0)  g/dL








                                 Pituitary panel











  10/08/23 Range/Units





  10:08 


 


Sodium  137  (137-145)  mmol/L


 


Potassium  4.0  (3.5-5.1)  mmol/L


 


Chloride  105  ()  mmol/L


 


Carbon Dioxide  21 L  (22-30)  mmol/L


 


BUN  13  (9-20)  mg/dL


 


Creatinine  1.02  (0.66-1.25)  mg/dL


 


Glucose  104 H  (74-99)  mg/dL


 


Calcium  8.8  (8.4-10.2)  mg/dL








                                  Adrenal panel











  10/08/23 Range/Units





  10:08 


 


Sodium  137  (137-145)  mmol/L


 


Potassium  4.0  (3.5-5.1)  mmol/L


 


Chloride  105  ()  mmol/L


 


Carbon Dioxide  21 L  (22-30)  mmol/L


 


BUN  13  (9-20)  mg/dL


 


Creatinine  1.02  (0.66-1.25)  mg/dL


 


Glucose  104 H  (74-99)  mg/dL


 


Calcium  8.8  (8.4-10.2)  mg/dL


 


Total Bilirubin  0.5  (0.2-1.3)  mg/dL


 


AST  27  (17-59)  U/L


 


ALT  19  (4-49)  U/L


 


Alkaline Phosphatase  38  ()  U/L


 


Total Protein  6.3  (6.3-8.2)  g/dL


 


Albumin  3.8  (3.5-5.0)  g/dL

## 2023-10-09 NOTE — P.PN
Subjective


Progress Note Date: 10/09/23





* 52-year-old and pelvis with past medical history significant for obstructive 

  sleep apnea, gastroesophageal reflux disease presented to the emergency 

  department with complains of bright red blood per rectum.


* Patient states his symptom onset was 5 days ago patient had bright red blood 

  in stool.  Patient states he had a CT abdomen pelvis done on 10/4/23 which was

  negative.  He was told to take a clear liquid diet and could be secondary to 

  hemorrhoidal bleed.  Patient said her bleeding persisted and he ultimately 

  decided to come to the emergency


* Workup initiated including CBC which showed a hemoglobin of 8.2, most recent 

  hemoglobin was 14.2 checked on 10/3/23.


* Serum chemistry obtained showed sodium of 137 potassium of 4 chloride of 105, 

  next a 21 BUN 13 1.0 to lactic acid 1.2


* On call surgery team was called and they accepted the patient for colonoscopy 

  to be scheduled on 10/9.  Patient to be started on clear liquid diet and bowel

  prep


* 10/9/23: Patient seen and evaluated in emergency department room 22.,  Wife at

  bedside as well.  Patient completed bowel prep.  No more bright red blood no

  ele in stool.  Follow-up H&H does show hemoglobin of 6.3.  A follow-up H&H has

  been ordered patient was given 2 units of packed RBC.  Patient remains 

  hemodynamically stable.  Surgery team to perform colonoscopy today.  Continue 

  to monitor for hypotension and recurrent bleed.  Care plan discussed with 

  patient in detail.  Afebrile temperature is 98.3 heart rate 93 blood pressure 

  1 34 x 84.  PER [address] [place] [address] staff request requested ICU evaluation for close 

  monitoring.





Objective





- Vital Signs


Vital signs: 


                                   Vital Signs











Temp  98.3 F   10/09/23 09:09


 


Pulse  93   10/09/23 09:09


 


Resp  16   10/09/23 09:09


 


BP  134/84   10/09/23 09:09


 


Pulse Ox  100   10/09/23 09:09


 


FiO2      








                                 Intake & Output











 10/08/23 10/09/23 10/09/23





 18:59 06:59 18:59


 


Intake Total  310 340


 


Balance  310 340


 


Weight 81.647 kg  81.647 kg


 


Intake:   


 


  IV   30


 


    Invasive Line 1   10


 


    Sodium Chloride 0.9% 1,   20





    000 ml @ 20 mls/hr IV .   





    Q24H [facility] Rx#:[id number]   


 


  Blood Product  310 310


 


    Rc As-1  Unit  310 





    [id number]   


 


    Rc As-1  Unit  0 310





    [id number]   


 


Other:   


 


  # Voids   2


 


  # Bowel Movements   0














- Exam





PHYSICAL EXAMINATION: 





GENERAL: The patient is alert and oriented x3, not in any acute distress. Well 

developed, well nourished. 


HEENT: Pupils are round and equally reacting to light. EOMI. No scleral icterus.

No conjunctival pallor. Normocephalic, atraumatic. No pharyngeal erythema. No 

thyromegaly. 


CARDIOVASCULAR: S1 and S2 present. No murmurs, rubs, or gallops. 


PULMONARY: Chest is clear to auscultation, no wheezing or crackles. 


ABDOMEN: Soft, nontender, nondistended, normoactive bowel sounds. No palpable 

organomegaly. 


MUSCULOSKELETAL: No joint swelling or deformity.


EXTREMITIES: No cyanosis, clubbing, or pedal edema. 


NEUROLOGICAL: Gross neurological examination did not reveal any focal deficits. 


SKIN: No rashes. 











- Labs


CBC & Chem 7: 


                                 10/09/23 00:50





                                 10/08/23 10:08


Labs: 


                  Abnormal Lab Results - Last 24 Hours (Table)











  10/08/23 10/08/23 10/09/23 Range/Units





  15:42 15:46 00:50 


 


RBC  2.31 L   2.00 L  (4.30-5.90)  m/uL


 


Hgb  7.4 L   6.3 L*  (13.0-17.5)  gm/dL


 


Hct  21.9 L   18.7 L*  (39.0-53.0)  %


 


RDW    16.2 H  (11.5-15.5)  %


 


Crossmatch   See Detail   














Assessment and Plan


Assessment: 








Assessment and plan





* Bright red blood per rectum suspect lower gastrointestinal bleed


* Acute blood loss anemia


* History of seasonal ALLERGIES





* In regards to blood loss anemia, lower GI bleed H&H ordered every 6 hours/type

  and screen ordered


* Status post 2 units of packed RBC 10/9, follow-up H&H ordered


* Continue patient on IV Protonix


* Continue clear liquid diet, general surgery consulted plan for colonoscopy 

  today


* Requested ICU team to evaluate secondary to ongoing GI bleed in significant 

  drop in hemoglobin


* CODE STATUS is full code

## 2023-10-10 VITALS
HEART RATE: 79 BPM | TEMPERATURE: 98.3 F | DIASTOLIC BLOOD PRESSURE: 81 MMHG | SYSTOLIC BLOOD PRESSURE: 131 MMHG | RESPIRATION RATE: 24 BRPM

## 2023-10-10 NOTE — P.DS
Providers


Date of admission: 


10/08/23 11:22





Attending physician: 


Hesham Harrington MD





Consults: 





                                        





10/08/23 11:44


Consult Physician Urgent 


   Consulting Provider: Terrance Zhu


   Consult Reason/Comments: hematochezia


   Do you want consulting provider notified?: Already Contacted





10/09/23 10:12


Consult Physician Urgent 


   Consulting Provider: Lino Baumann


   Consult Reason/Comments: gi bleed, hgb 6.3, active, poss need for ICU?


   Do you want consulting provider notified?: Yes











Primary care physician: 


[facility] Course: 











* 52-year-old and pelvis with past medical history significant for obstructive 

  sleep apnea, gastroesophageal reflux disease presented to the emergency 

  department with complains of bright red blood per rectum.


* Patient states his symptom onset was 5 days ago patient had bright red blood 

  in stool.  Patient states he had a CT abdomen pelvis done on 10/4/23 which was

  negative.  He was told to take a clear liquid diet and could be secondary to 

  hemorrhoidal bleed.  Patient said her bleeding persisted and he ultimately 

  decided to come to the emergency


* Workup initiated including CBC which showed a hemoglobin of 8.2, most recent 

  hemoglobin was 14.2 checked on 10/3/23.


* Serum chemistry obtained showed sodium of 137 potassium of 4 chloride of 105, 

  next a 21 BUN 13 1.0 to lactic acid 1.2


* On call surgery team was called and they accepted the patient for colonoscopy 

  to be scheduled on 10/9.  Patient to be started on clear liquid diet and bowel

  prep


* 10/9/23: Patient seen and evaluated in emergency department room 22.,  Wife at

  bedside as well.  Patient completed bowel prep.  No more bright red blood 

  noted in stool.  Follow-up H&H does show hemoglobin of 6.3.  A follow-up H&H 

  has been ordered patient was given 2 units of packed RBC.  Patient remains 

  hemodynamically stable.  Surgery team to perform colonoscopy today.  Continue 

  to monitor for hypotension and recurrent bleed.  Care plan discussed with 

  patient in detail.  Afebrile temperature is 98.3 heart rate 93 blood pressure 

  1 34 x 84.  PER [facility] staff request requested ICU evaluation for close 

  monitoring.


* Patient had significant bleeding, EGD was completed, colonoscopy unable to 

  review secondary to excessive blood.  CT angiography was ordered.  Patient was

  given additional 2 units while in ICU.  Total 4 units given.  H&H ordered.  

  Any for [place] called for transfer for GI and interventional radiology 

  evaluation.  Patient discharged and transferred in a stable condition


* 


* 


PHYSICAL EXAMINATION: 


Exam during morning rounds


GENERAL: The patient is alert and oriented x3, not in any acute distress. Well 

developed, well nourished. 


HEENT: Pupils are round and equally reacting to light. EOMI. No scleral icterus.

No conjunctival pallor. Normocephalic, atraumatic. No pharyngeal erythema. No th

yromegaly. 


CARDIOVASCULAR: S1 and S2 present. No murmurs, rubs, or gallops. 


PULMONARY: Chest is clear to auscultation, no wheezing or crackles. 


ABDOMEN: Soft, nontender, nondistended, normoactive bowel sounds. No palpable 

organomegaly. 


MUSCULOSKELETAL: No joint swelling or deformity.


EXTREMITIES: No cyanosis, clubbing, or pedal edema. 


NEUROLOGICAL: Gross neurological examination did not reveal any focal deficits. 


SKIN: No rashes. 


Assessment: 





Assessment and plan





* Bright red blood per rectum suspect lower gastrointestinal bleed


* Acute blood loss anemia


* History of seasonal ALLERGIES





* In regards to blood loss anemia, lower GI bleed H&H ordered every 6 hours/type

  and screen ordered


* Status post 2 units of packed RBC 10/9, follow-up H&H ordered


* Continue patient on IV Protonix


* Continue clear liquid diet, general surgery consulted plan for colonoscopy 

  today


* Requested ICU team to evaluate secondary to ongoing GI bleed in significant 

  drop in hemoglobin


* Unable to clearly see source of bleeding, lower GI bleed colonoscopy 

  inconclusive excessive blood noted.  Patient transferred to [facility] 

  for further evaluation





Plan - Discharge Summary


Discharge Rx Participant: No


New Discharge Prescriptions: 


No Action


   Montelukast Sodium [Singulair] 10 mg PO HS


   Loratadine-Pseudoeph  mg [Claritin-D 24 Hour] 1 tab PO HS


Discharge Medication List





Loratadine-Pseudoeph  mg [Claritin-D 24 Hour] 1 tab PO HS 01/30/21 

[History]


Montelukast Sodium [Singulair] 10 mg PO HS 01/30/21 [History]








Follow up Appointment(s)/Referral(s): 


Jose Mancera MD [Primary Care Provider] - 1-2 days


Discharge Disposition: OTHER INSTITUTION NOT DEFINED

## 2024-05-05 ENCOUNTER — HOSPITAL ENCOUNTER (EMERGENCY)
Dept: HOSPITAL 47 - EC | Age: 54
Discharge: HOME | End: 2024-05-05
Payer: COMMERCIAL

## 2024-05-05 VITALS — TEMPERATURE: 98.2 F

## 2024-05-05 VITALS — RESPIRATION RATE: 18 BRPM | HEART RATE: 76 BPM | SYSTOLIC BLOOD PRESSURE: 116 MMHG | DIASTOLIC BLOOD PRESSURE: 83 MMHG

## 2024-05-05 DIAGNOSIS — F12.90: ICD-10-CM

## 2024-05-05 DIAGNOSIS — M25.512: Primary | ICD-10-CM

## 2024-05-05 DIAGNOSIS — Z87.891: ICD-10-CM

## 2024-05-05 PROCEDURE — 96374 THER/PROPH/DIAG INJ IV PUSH: CPT

## 2024-05-05 PROCEDURE — 73030 X-RAY EXAM OF SHOULDER: CPT

## 2024-05-05 PROCEDURE — 96375 TX/PRO/DX INJ NEW DRUG ADDON: CPT

## 2024-05-05 PROCEDURE — 99283 EMERGENCY DEPT VISIT LOW MDM: CPT

## 2024-05-05 RX ADMIN — KETOROLAC TROMETHAMINE STA MG: 15 INJECTION, SOLUTION INTRAMUSCULAR; INTRAVENOUS at 06:43

## 2024-05-05 RX ADMIN — ORPHENADRINE CITRATE STA MG: 30 INJECTION, SOLUTION INTRAMUSCULAR; INTRAVENOUS at 06:44

## 2024-05-05 RX ADMIN — DEXTROSE STA MG: 50 INJECTION, SOLUTION INTRAVENOUS at 06:44

## 2024-05-05 RX ADMIN — ACETAMINOPHEN AND CODEINE PHOSPHATE STA EACH: 300; 30 TABLET ORAL at 08:30

## 2024-05-05 RX ADMIN — MORPHINE SULFATE STA MG: 4 INJECTION, SOLUTION INTRAMUSCULAR; INTRAVENOUS at 06:44

## 2024-05-05 RX ADMIN — KETOROLAC TROMETHAMINE STA: 15 INJECTION, SOLUTION INTRAMUSCULAR; INTRAVENOUS at 07:11

## 2024-05-05 RX ADMIN — ORPHENADRINE CITRATE STA: 30 INJECTION, SOLUTION INTRAMUSCULAR; INTRAVENOUS at 07:09

## 2024-05-05 NOTE — XR
Left shoulder.

 

HISTORY: Pain without trauma.

 

COMPARISON: None.

 

TECHNIQUE: 2 views left shoulder were obtained.

 

FINDINGS:

 

There is no fracture, dislocation, intraosseous, intra-articular or soft tissue abnormality.

 

IMPRESSION:

 

No significant abnormality seen.

## 2024-05-05 NOTE — ED
Extremity Problem HPI





- General


Chief complaint: Extremity Problem,Nontraumatic


Stated complaint: Left shoulder pain


Time Seen by Provider: 05/05/24 05:58


Source: patient, RN notes reviewed


Mode of arrival: ambulatory


Limitations: no limitations





- History of Present Illness


Initial comments: 


This is a 53-year-old male who presents to the emergency department for left 

shoulder pain.  States that yesterday he woke up in the morning with severe pain

in the left shoulder, which has since been getting worse.  Unsure if he may have

done something to injure it in his sleep.  He is barely able to move the arm due

to his pain.  Denies any chest pain or shortness of breath. Pain does not 

radiate anywhere. He tried taking over-the-counter medication for his pain 

without any relief in symptoms.  He also took a medication previously prescribed

by his primary care provider for neck pain, but cannot recall what this was.  

This was not effective either.





MD Complaint: extremity pain





- Related Data


                                Home Medications











 Medication  Instructions  Recorded  Confirmed


 


Loratadine-Pseudoeph  mg 1 tab PO HS 01/30/21 10/08/23





[Claritin-D 24 Hour]   


 


Montelukast Sodium [Singulair] 10 mg PO HS 01/30/21 10/08/23








                                  Previous Rx's











 Medication  Instructions  Recorded


 


Naproxen Sodium 550 mg PO BID PRN #30 tablet 05/05/24


 


methocarbamoL [Robaxin-750] 1,500 mg PO TID PRN #30 tab 05/05/24











                                    Allergies











Allergy/AdvReac Type Severity Reaction Status Date / Time


 


No Known Allergies Allergy   Verified 05/05/24 05:07














Review of Systems


ROS Statement: 


Those systems with pertinent positive or pertinent negative responses have been 

documented in the HPI.





ROS Other: All systems not noted in ROS Statement are negative.





Past Medical History


Past Medical History: GI Bleed, Hyperlipidemia


Additional Past Medical History / Comment(s): divereticulitis


History of Any Multi-Drug Resistant Organisms: None Reported


Additional Past Surgical History / Comment(s): parathyroid surgery


Past Anesthesia/Blood Transfusion Reactions: No Reported Reaction


Past Psychological History: No Psychological Hx Reported


Smoking Status: Former smoker


Past Alcohol Use History: Occasional


Past Drug Use History: Marijuana





General Exam


Limitations: no limitations


General appearance: alert, in distress


Head exam: Present: atraumatic, normocephalic, normal inspection


Respiratory exam: Present: normal lung sounds bilaterally.  Absent: respiratory 

distress, wheezes, rales, rhonchi, stridor


Cardiovascular Exam: Present: regular rate, normal rhythm, normal heart sounds. 

Absent: systolic murmur, diastolic murmur, rubs, gallop, clicks


Extremities exam: Present: other (Tenderness to palpation over the left 

shoulder.  Little to no range of motion secondary to pain.  No swelling, 

erythema, or deformities.  2+ radial pulses.)


Neurological exam: Present: alert, oriented X3, CN II-XII intact


Psychiatric exam: Present: normal affect, normal mood


Skin exam: Present: warm, dry, intact, normal color.  Absent: rash





Course


                                   Vital Signs











  05/05/24 05/05/24





  05:06 06:51


 


Temperature 98.2 F 


 


Pulse Rate 79 69


 


Respiratory 18 20





Rate  


 


Blood Pressure 162/99 137/103


 


O2 Sat by Pulse 98 95





Oximetry  














Medical Decision Making





- Medical Decision Making


This is a 53 year old male who presents to the emergency department for left 

shoulder pain. 





Was pt. sent in by a medical professional or institution?


@  -No   


Did you speak to anyone other than the patient for history?  


@  -No


Did you review nursing and triage notes? 


@  -Yes, and I agree, it is accurate with regards to the patient's symptoms.


Were old charts reviewed? 


@  -No


Differential Diagnosis? 


@  -Differential Musculoskeletal:


Muscular strain, contusion, ligament sprain, fracture, arthritis, septic 

arthritis, bursitis, cellulitis, muscle spasm, nerve compression, DVT, arterial 

occlusion, herpes zoster, electrolyte abnormality, tumor.... This is not meant 

to be in all inclusive list


EKG interpreted by me (3pts min.)?


@  -Not obtained


X-rays interpreted by me (1pt min.)?


@  -X-ray of the left shoulder obtained. My interpretation identifies no acute 

fractures. 


CT interpreted by me (1pt min.)?


@  -Not obtained


U/S interpreted by me (1pt. min.)?


@  -Not obtained


What testing was considered but not performed? (CT, X-rays, U/S, labs)? Why?


@  -None


What meds were considered but not given? Why?


@  -None


Did you discuss the management of the patient with other professionals?


@  -No


Did you reconcile home meds?


@  -No


Was smoking cessation discussed for >3mins.?


@  -No


Was critical care preformed (if so, how long)?


@  -No


Were there social determinants of health that impacted care today? How? 

(Homelessness, low income, unemployed, alcoholism, drug addiction, 

transportation, low edu. Level, literacy, decrease access to med. care, intermediate, 

rehab)?


@  -No


Was there de-escalation of care discussed even if they declined? (Discuss DNR or

withdrawal of care, Hospice)?


@  -No


What co-morbidities impacted this encounter? (DM, HTN, Smoking, COPD, CAD, 

Cancer, CVA, Hep., AIDS, mental health diagnosis, sleep apnea, morbid obesity)?


@  -None


Was patient admitted / discharged?


@  -Discharged.  X-ray of the left shoulder obtained revealing no acute process.

 Patient had no external irregularities such as swelling, erythema, warmth, a 

rash, or deformities.  Discussed with the patient that this does not rule out 

other irregularities such as a tendinitis or issues with a rotator cuff.  Pain 

medication administered in the emergency department with improvement in 

symptoms.  Prescription for Naproxen and Robaxin provided with dosing 

instructions reviewed.  He was also given information for follow-up with 

orthopedics for further evaluation. Patient discharged home in stable condition.




Undiagnosed new problem with uncertain prognosis?


@  -None


Drug Therapy requiring intensive monitoring for toxicity (Heparin, Nitro, 

Insulin, Cardizem)?


@  -None


Were any procedures done?


@  -None


Diagnosis/symptom?


@  -Left shoulder pain


Acute, or Chronic, or Acute on Chronic?


@  -Acute


Uncomplicated (without systemic symptoms) or Complicated (systemic symptoms)?


@  -Uncomplicated


Side effects of treatment?


@  -None


Exacerbation, Progression, or Severe Exacerbation]


@  -Not applicable


Poses a threat to life or bodily function?


@  -This may limit his use of the left arm for the mean time. 





Return precautions reviewed in depth, the patient is instructed to return to the

emergency department with any new, worsening, or concerning symptoms. Patient 

verbalized understanding.





This case was discussed in detail with the attending ED physician, Dr. Kaminski. 

Presentation, findings, and treatment plan discussed in detail as well.








- Radiology Data


Radiology results: report reviewed, image reviewed





Disposition


Clinical Impression: 


 Left shoulder pain





Disposition: HOME SELF-CARE


Instructions (If sedation given, give patient instructions):  Shoulder Pain (ED)


Additional Instructions: 


Return to the emergency department with any new, worsening, or concerning 

symptoms.  Take the naproxen twice daily as needed for pain relief.  If you 

choose to take this, do not take any other anti-inflammatories such as 

ibuprofen, take one or the other.  Take the Robaxin as 1 to 2 tablets 3-4 times 

daily.  Be aware that this may make you drowsy.  Contact orthopedics as listed 

below for a follow-up appointment and further evaluation.  Follow up with your 

primary care provider in 1-2 days.


Prescriptions: 


Naproxen Sodium 550 mg PO BID PRN #30 tablet


 PRN Reason: Pain


methocarbamoL [Robaxin-750] 1,500 mg PO TID PRN #30 tab


 PRN Reason: Pain


Is patient prescribed a controlled substance at d/c from ED?: No


Referrals: 


Jose Mancera MD [Primary Care Provider] - 1-2 days


Dav Carbone MD [Medical Doctor] - 1-2 days


Time of Disposition: 06:50